# Patient Record
Sex: FEMALE | Race: BLACK OR AFRICAN AMERICAN | Employment: FULL TIME | URBAN - METROPOLITAN AREA
[De-identification: names, ages, dates, MRNs, and addresses within clinical notes are randomized per-mention and may not be internally consistent; named-entity substitution may affect disease eponyms.]

---

## 2022-04-27 ENCOUNTER — OFFICE VISIT (OUTPATIENT)
Dept: SURGERY | Age: 57
End: 2022-04-27
Payer: COMMERCIAL

## 2022-04-27 DIAGNOSIS — N61.1 BREAST ABSCESS: Primary | ICD-10-CM

## 2022-04-27 PROCEDURE — 99203 OFFICE O/P NEW LOW 30 MIN: CPT | Performed by: SURGERY

## 2022-04-27 PROCEDURE — 76642 ULTRASOUND BREAST LIMITED: CPT | Performed by: SURGERY

## 2022-04-27 RX ORDER — ATENOLOL 25 MG/1
25 TABLET ORAL
COMMUNITY
End: 2022-10-11 | Stop reason: ALTCHOICE

## 2022-04-27 RX ORDER — ESCITALOPRAM OXALATE 20 MG/1
20 TABLET ORAL
COMMUNITY

## 2022-04-27 RX ORDER — HYDROCHLOROTHIAZIDE 25 MG/1
25 TABLET ORAL DAILY
COMMUNITY

## 2022-04-27 NOTE — LETTER
4/27/2022 5:09 PM    Patient:  Olivia Love   YOB: 1965  Date of Visit: 4/27/2022      Dear Berhane Allen: Thank you for referring Ms. Will Miller to me for evaluation/treatment. Below are the relevant portions of my assessment and plan of care. If you have questions, please do not hesitate to call me. I look forward to following Ms. Hughes along with you.         Sincerely,    Fatmata Vazquez MD

## 2022-04-27 NOTE — PROGRESS NOTES
HISTORY OF PRESENT ILLNESS  Brandy Jackson is a 78 y.o. female. HPI NEW patient consult referred by  Dr. Adelina Corona for LEFT breast abscess. Symptoms started in 10/2021. The abscess is recurrent x3 and patient has taken 3 rounds of antibiotics. Thought abscess was coming back again but within the past week it has gone down. Gets a shooting pain and thickening of breast skin when it's about to flare. Hx of bilateral breast reduction in  and dx with fibrocystic breast about 25 years ago. Family History: Maternal aunt, breast cancer, dx 72,   Mother, colon cancer, dx 54,       Mammogram, [address], 22, BIRADS 2, LEFT subareolar abscess @ 6:00    Review of Systems   Psychiatric/Behavioral: The patient is nervous/anxious.         Physical Exam    ASSESSMENT and PLAN  {ASSESSMENT/PLAN:40509}

## 2022-04-27 NOTE — PROGRESS NOTES
HISTORY OF PRESENT ILLNESS  Marisela Odell is a 65 y.o. female. HPI  NEW patient consult referred by  Dr. Linda Queen for LEFT breast abscess. Symptoms started in 10/2021. The abscess is recurrent x3 and patient has taken 3 rounds of antibiotics. Thought abscess was coming back again but within the past week it has gone down. Gets a shooting pain and thickening of breast skin when it's about to flare.     Hx of bilateral breast reduction in  and dx with fibrocystic breast about 25 years ago.      Family History: Maternal aunt, breast cancer, dx 72,   Mother, colon cancer, dx 54,     Mammogram, [address], 22, BIRADS 2, LEFT subareolar abscess @ 6:00       No past medical history on file. No past surgical history on file. Social History     Socioeconomic History    Marital status: SINGLE     Spouse name: Not on file    Number of children: Not on file    Years of education: Not on file    Highest education level: Not on file   Occupational History    Not on file   Tobacco Use    Smoking status: Never Smoker    Smokeless tobacco: Never Used   Substance and Sexual Activity    Alcohol use: Yes     Alcohol/week: 5.0 standard drinks     Types: 5 Standard drinks or equivalent per week    Drug use: Never    Sexual activity: Not on file   Other Topics Concern    Not on file   Social History Narrative    Not on file     Social Determinants of Health     Financial Resource Strain:     Difficulty of Paying Living Expenses: Not on file   Food Insecurity:     Worried About Running Out of Food in the Last Year: Not on file    Zay of Food in the Last Year: Not on file   Transportation Needs:     Lack of Transportation (Medical): Not on file    Lack of Transportation (Non-Medical):  Not on file   Physical Activity:     Days of Exercise per Week: Not on file    Minutes of Exercise per Session: Not on file   Stress:     Feeling of Stress : Not on file   Social Connections:     Frequency of Communication with Friends and Family: Not on file    Frequency of Social Gatherings with Friends and Family: Not on file    Attends Scientologist Services: Not on file    Active Member of Clubs or Organizations: Not on file    Attends Club or Organization Meetings: Not on file    Marital Status: Not on file   Intimate Partner Violence:     Fear of Current or Ex-Partner: Not on file    Emotionally Abused: Not on file    Physically Abused: Not on file    Sexually Abused: Not on file   Housing Stability:     Unable to Pay for Housing in the Last Year: Not on file    Number of [place] in the Last Year: Not on file    Unstable Housing in the Last Year: Not on file       Current Outpatient Medications on File Prior to Visit   Medication Sig Dispense Refill    hydroCHLOROthiazide (HYDRODIURIL) 25 mg tablet Take 25 mg by mouth daily.  escitalopram oxalate (Lexapro) 20 mg tablet Take 20 mg by mouth daily.  atenoloL (TENORMIN) 25 mg tablet Take 25 mg by mouth daily. No current facility-administered medications on file prior to visit. Allergies   Allergen Reactions    Aleve [Naproxen Sodium] Unknown (comments)           ROS  Psychiatric/Behavioral: The patient is nervous/anxious. Physical Exam  Exam conducted with a chaperone present. Cardiovascular:      Rate and Rhythm: Normal rate and regular rhythm. Heart sounds: Normal heart sounds. Pulmonary:      Breath sounds: Normal breath sounds. Chest:   Breasts: Breasts are symmetrical.      Right: Normal. No swelling, bleeding, inverted nipple, mass, nipple discharge, skin change, tenderness, axillary adenopathy or supraclavicular adenopathy. Left: Normal. No swelling, bleeding, inverted nipple, mass, nipple discharge, skin change, tenderness, axillary adenopathy or supraclavicular adenopathy.          Lymphadenopathy:      Cervical:      Right cervical: No superficial, deep or posterior cervical adenopathy. Left cervical: No superficial, deep or posterior cervical adenopathy. Upper Body:      Right upper body: No supraclavicular or axillary adenopathy. Left upper body: No supraclavicular or axillary adenopathy. BREAST ULTRASOUND  Indication: LEFT breast mass areola  Technique: The area was scanned using a high-frequency linear-array near-field transducer  Findings: Fluid filled duct tract  Impression: duct fistula tract  Disposition: excision      ASSESSMENT and PLAN    ICD-10-CM ICD-9-CM    1. Breast abscess  N61.1 611.0      New patient presents for evaluation of LEFT breast lesion and is doing well overall. Upon physical examination, noted tubular, mass like structure from nipple to edge of areola at 5:00. LEFT breast US visualizes fluid filled duct, consistent with nipple duct fistula tract. We discussed surgery as an option for treatment. This will be an outpatient procedure, with sutures under the skin, waterproof glue over the incision, and expected recovery time of about 1 week. F/U will call to schedule surgery. This plan was reviewed with the patient and patient agrees. All questions were answered. Total time spent was 40 minutes.     Written by Hang Parisi, as dictated by Dr. Wayne Lockett MD.

## 2022-05-05 DIAGNOSIS — N61.1 BREAST ABSCESS OF FEMALE: Primary | ICD-10-CM

## 2022-05-19 ENCOUNTER — HOSPITAL ENCOUNTER (OUTPATIENT)
Dept: PREADMISSION TESTING | Age: 57
Discharge: HOME | End: 2022-05-19
Payer: COMMERCIAL

## 2022-05-19 VITALS
WEIGHT: 250 LBS | HEART RATE: 73 BPM | RESPIRATION RATE: 18 BRPM | HEIGHT: 67 IN | BODY MASS INDEX: 39.24 KG/M2 | DIASTOLIC BLOOD PRESSURE: 79 MMHG | TEMPERATURE: 97.6 F | SYSTOLIC BLOOD PRESSURE: 125 MMHG

## 2022-05-19 DIAGNOSIS — N61.1 BREAST ABSCESS OF FEMALE: ICD-10-CM

## 2022-05-19 LAB
ANION GAP SERPL CALC-SCNC: 3 MMOL/L (ref 5–15)
BASOPHILS # BLD: 0 K/UL (ref 0–0.1)
BASOPHILS NFR BLD: 1 % (ref 0–1)
BUN SERPL-MCNC: 12 MG/DL (ref 6–20)
BUN/CREAT SERPL: 16 (ref 12–20)
CALCIUM SERPL-MCNC: 8.5 MG/DL (ref 8.5–10.1)
CHLORIDE SERPL-SCNC: 108 MMOL/L (ref 97–108)
CO2 SERPL-SCNC: 29 MMOL/L (ref 21–32)
CREAT SERPL-MCNC: 0.77 MG/DL (ref 0.55–1.02)
DIFFERENTIAL METHOD BLD: ABNORMAL
EOSINOPHIL # BLD: 0.1 K/UL (ref 0–0.4)
EOSINOPHIL NFR BLD: 2 % (ref 0–7)
ERYTHROCYTE [DISTWIDTH] IN BLOOD BY AUTOMATED COUNT: 12.9 % (ref 11.5–14.5)
GLUCOSE SERPL-MCNC: 109 MG/DL (ref 65–100)
HCT VFR BLD AUTO: 40 % (ref 35–47)
HGB BLD-MCNC: 13.9 G/DL (ref 11.5–16)
IMM GRANULOCYTES # BLD AUTO: 0 K/UL (ref 0–0.04)
IMM GRANULOCYTES NFR BLD AUTO: 1 % (ref 0–0.5)
LYMPHOCYTES # BLD: 1.8 K/UL (ref 0.8–3.5)
LYMPHOCYTES NFR BLD: 41 % (ref 12–49)
MCH RBC QN AUTO: 32.2 PG (ref 26–34)
MCHC RBC AUTO-ENTMCNC: 34.8 G/DL (ref 30–36.5)
MCV RBC AUTO: 92.6 FL (ref 80–99)
MONOCYTES # BLD: 0.4 K/UL (ref 0–1)
MONOCYTES NFR BLD: 8 % (ref 5–13)
NEUTS SEG # BLD: 2.1 K/UL (ref 1.8–8)
NEUTS SEG NFR BLD: 47 % (ref 32–75)
NRBC # BLD: 0 K/UL (ref 0–0.01)
NRBC BLD-RTO: 0 PER 100 WBC
PLATELET # BLD AUTO: 197 K/UL (ref 150–400)
PMV BLD AUTO: 10.3 FL (ref 8.9–12.9)
POTASSIUM SERPL-SCNC: 3.8 MMOL/L (ref 3.5–5.1)
RBC # BLD AUTO: 4.32 M/UL (ref 3.8–5.2)
SODIUM SERPL-SCNC: 140 MMOL/L (ref 136–145)
WBC # BLD AUTO: 4.4 K/UL (ref 3.6–11)

## 2022-05-19 PROCEDURE — 85025 COMPLETE CBC W/AUTO DIFF WBC: CPT

## 2022-05-19 PROCEDURE — 80048 BASIC METABOLIC PNL TOTAL CA: CPT

## 2022-05-19 PROCEDURE — 93005 ELECTROCARDIOGRAM TRACING: CPT

## 2022-05-19 NOTE — PERIOP NOTES
[address] INSTRUCTIONS    Surgery Date:   5/26/22    [facility] staff will call you between 4 PM- 8 PM the day before surgery with your arrival time. If your surgery is on a Monday, we will call you the preceding Friday. Please call [phone] after 8 PM if you did not receive your arrival time. 1. Please report to [facility] Patient Access/Admitting on the 1st floor. Bring your insurance card, photo identification, and any copayment ( if applicable). 2. If you are going home the same day of your surgery, you must have a responsible adult to drive you home. You need to have a responsible adult to stay with you the first 24 hours after surgery and you should not drive a car for 24 hours following your surgery. 3. Do NOT eat any solid foods after midnight the night before surgery including candy, mint or gum. You may drink clear liquids from midnight until 1 hour prior to your arrival. You may drink up to 12 ounces at one time every 4 hours. Please note special instructions, if applicable, below for medications. 4. Do NOT drink alcohol or smoke 24 hours before surgery. STOP smoking for 14 days prior as it helps with breathing and healing after surgery. 5. If you are being admitted to the hospital, please leave personal belongings/luggage in your car until you have an assigned hospital room number. 6. Please wear comfortable clothes. Wear your glasses instead of contacts. We ask that all money, jewelry and valuables be left at home. Wear no make up, particularly mascara, the day of surgery. 7.  All body piercings, rings, and jewelry need to be removed and left at home. Please remove any nail polish or artifical nails from your fingernails. Please wear your hair loose or down. Please no pony-tails, buns, or any metal hair accessories. If you shower the morning of surgery, please do not apply any lotions or powders afterwards. You may wear deodorant, unless having breast surgery.   Do not shave any body area within 24 hours of your surgery. 8. Please follow all instructions to avoid any potential surgical cancellation. 9. Should your physical condition change, (i.e. fever, cold, flu, etc.) please notify your surgeon as soon as possible. 10. It is important to be on time. If a situation occurs where you may be delayed, please call:  [phone] / [phone] on the day of surgery. 11. The Preadmission Testing staff can be reached at [phone]. 12. Special instructions: NONE      Current Outpatient Medications   Medication Sig    hydroCHLOROthiazide (HYDRODIURIL) 25 mg tablet Take 25 mg by mouth nightly.  escitalopram oxalate (Lexapro) 20 mg tablet Take 20 mg by mouth nightly.  atenoloL (TENORMIN) 25 mg tablet Take 25 mg by mouth nightly. No current facility-administered medications for this encounter. 1. YOU MUST ONLY TAKE THESE MEDICATIONS THE MORNING OF SURGERY WITH A SIP OF WATER: NONE  2. MEDICATIONS TO TAKE THE MORNING OF SURGERY ONLY IF NEEDED: NONE  3. HOLD these prescription medications BEFORE Surgery: NONE  4. Ask your surgeon/prescribing physician about when/if to STOP taking these medications: NONE  5. Stop all vitamins, herbal medicines and Aspirin containing products 7 days prior to surgery. Stop any non-steroidal anti-inflammatory drugs (i.e. Ibuprofen, Naproxen, Advil, Aleve) 3 days before surgery. You may take Tylenol. 6. If you are currently taking Plavix, Coumadin,or any other blood-thinning/anticoagulant medication contact your prescribing physician for instructions. Eating and Drinking Before Surgery     You may eat a regular dinner at the usual time on the day before your surgery.  Do NOT eat any solid foods after midnight unless your arrival time at the hospital is 3pm or later.  You may drink clear liquids only from 12 midnight until 1 hours prior to your arrival time at the hospital on the day of your surgery.  Do NOT drink alcohol.  Clear liquids include:  o Water  o Fruit juices without pulp( i.e. apple juice)  o Carbonated beverages  o Black coffee (no cream/milk)  o Tea (no cream/milk)  o Gatorade   You may drink up to 12-16 ounces at one time every 4 hours between the hours of midnight and 1 hour before your arrival time at the hospital. Example- if your arrival time at the hospital is 6am, you may drink 12-16 ounces of clear liquids no later than 5am.   If your arrival time at the hospital is 3pm or later, you may eat a light breakfast before 8am.   A light breakfast includes:  o Toast or bagel (no butter)  o Black coffee (no cream/milk)  o Tea (no cream/milk)  o Fruit juices without pulp ( i.e. apple juice)  o Do NOT eat meat, eggs, vegetables or fruit   If you have any questions, please contact your surgeon's office. Preventing Infections Before and After  Your Surgery    IMPORTANT INSTRUCTIONS    You play an important role in your health and preparation for surgery. To reduce the germs on your skin you will need to shower with CHG soap (Chorhexidine gluconate 4%) two times before surgery. CHG soap (Hibiclens, Hex-A-Clens or store brand)   CHG soap will be provided at your Preadmission Testing (PAT) appointment.  If you do not have a PAT appointment before surgery, you may arrange to  CHG soap from our office or purchase CHG soap at a pharmacy, grocery or department store.  You need to purchase TWO 4 ounce bottles to use for your 2 showers. Steps to follow:  1. Wash your hair with your normal shampoo and your body with regular soap and rinse well to remove shampoo and soap from your skin. 2. Wet a clean washcloth and turn off the shower. 3. Put CHG soap on washcloth and apply to your entire body from the neck down. Do not use on your head, face or private parts(genitals). Do not use CHG soap on open sores, wounds or areas of skin irritation. 4. Wash you body gently for 5 minutes.  Do not wash your skin too hard. This soap does not create lather. Pay special attention to your underarms and from your belly button to your feet. 5. Turn the shower back on and rinse well to get CHG soap off your body. 6. Pat your skin dry with a clean, dry towel. Do not apply lotions or moisturizer. 7. Put on clean clothes and sleep on fresh bed sheets and do not allow pets to sleep with you. Shower with CHG soap 2 times before your surgery   The evening before your surgery   The morning of your surgery      Tips to help prevent infections after your surgery:  1. Protect your surgical wound from germs:  ? Hand washing is the most important thing you and your caregivers can do to prevent infections. ? Keep your bandage clean and dry! ? Do not touch your surgical wound. 2. Use clean, freshly washed towels and washcloths every time you shower; do not share bath linens with others. 3. Until your surgical wound is healed, wear clothing and sleep on bed linens each day that are clean and freshly washed. 4. Do not allow pets to sleep in your bed with you or touch your surgical wound. 5. Do not smoke  smoking delays wound healing. This may be a good time to stop smoking. 6. If you have diabetes, it is important for you to manage your blood sugar levels properly before your surgery as well as after your surgery. Poorly managed blood sugar levels slow down wound healing and prevent you from healing completely. Patient Information Regarding COVID Restrictions      Day of Procedure     Please park in the parking deck or any designated visitor parking lot.  Enter the facility through the [facility] of the [facility].   On the day of surgery, please provide the cell phone number of the person who will be waiting for you to the Patient Access representative at the time of registration.  Please wear a mask on the day of your procedure.  We are now allowing two designated visitors per stay.  Pediatric patients may have 2 designated visitors. These two people may come in with you on the day of your procedure.  No visitors under the age of 13.  The designated visitor must also wear a mask.  Once your procedure and the immediate recovery period is completed, a nurse in the recovery area will contact your designated visitor to inform them of your room number or to otherwise review other pertinent information regarding your care.  Social distancing practices are to be adhered to in waiting areas and the cafeteria. The patient was contacted  in person. She verbalized understanding of all instructions does not  need reinforcement.

## 2022-05-20 LAB
ATRIAL RATE: 50 BPM
CALCULATED P AXIS, ECG09: 40 DEGREES
CALCULATED R AXIS, ECG10: -3 DEGREES
CALCULATED T AXIS, ECG11: 24 DEGREES
DIAGNOSIS, 93000: NORMAL
P-R INTERVAL, ECG05: 142 MS
Q-T INTERVAL, ECG07: 458 MS
QRS DURATION, ECG06: 86 MS
QTC CALCULATION (BEZET), ECG08: 417 MS
VENTRICULAR RATE, ECG03: 50 BPM

## 2022-05-25 ENCOUNTER — ANESTHESIA EVENT (OUTPATIENT)
Dept: MEDSURG UNIT | Age: 57
End: 2022-05-25
Payer: COMMERCIAL

## 2022-05-26 ENCOUNTER — HOSPITAL ENCOUNTER (OUTPATIENT)
Age: 57
Setting detail: OUTPATIENT SURGERY
Discharge: HOME | End: 2022-05-26
Attending: SURGERY | Admitting: SURGERY
Payer: COMMERCIAL

## 2022-05-26 ENCOUNTER — ANESTHESIA (OUTPATIENT)
Dept: MEDSURG UNIT | Age: 57
End: 2022-05-26
Payer: COMMERCIAL

## 2022-05-26 VITALS
RESPIRATION RATE: 21 BRPM | SYSTOLIC BLOOD PRESSURE: 125 MMHG | TEMPERATURE: 97.6 F | WEIGHT: 250 LBS | DIASTOLIC BLOOD PRESSURE: 77 MMHG | HEART RATE: 66 BPM | OXYGEN SATURATION: 97 % | BODY MASS INDEX: 39.16 KG/M2

## 2022-05-26 DIAGNOSIS — N61.1 LEFT BREAST ABSCESS: ICD-10-CM

## 2022-05-26 PROCEDURE — 77030040361 HC SLV COMPR DVT MDII -B: Performed by: SURGERY

## 2022-05-26 PROCEDURE — 74011000250 HC RX REV CODE- 250: Performed by: NURSE ANESTHETIST, CERTIFIED REGISTERED

## 2022-05-26 PROCEDURE — 2709999900 HC NON-CHARGEABLE SUPPLY: Performed by: SURGERY

## 2022-05-26 PROCEDURE — 77030011267 HC ELECTRD BLD COVD -A: Performed by: SURGERY

## 2022-05-26 PROCEDURE — 77030002933 HC SUT MCRYL J&J -A: Performed by: SURGERY

## 2022-05-26 PROCEDURE — 77030010507 HC ADH SKN DERMBND J&J -B: Performed by: SURGERY

## 2022-05-26 PROCEDURE — 74011250637 HC RX REV CODE- 250/637: Performed by: ANESTHESIOLOGY

## 2022-05-26 PROCEDURE — 77030002996 HC SUT SLK J&J -A: Performed by: SURGERY

## 2022-05-26 PROCEDURE — 76030000001 HC AMB SURG OR TIME 1 TO 1.5: Performed by: SURGERY

## 2022-05-26 PROCEDURE — 74011250636 HC RX REV CODE- 250/636: Performed by: NURSE ANESTHETIST, CERTIFIED REGISTERED

## 2022-05-26 PROCEDURE — 74011000250 HC RX REV CODE- 250: Performed by: SURGERY

## 2022-05-26 PROCEDURE — 77030040922 HC BLNKT HYPOTHRM STRY -A

## 2022-05-26 PROCEDURE — 77030031139 HC SUT VCRL2 J&J -A: Performed by: SURGERY

## 2022-05-26 PROCEDURE — 77030010509 HC AIRWY LMA MSK TELE -A: Performed by: ANESTHESIOLOGY

## 2022-05-26 PROCEDURE — 88307 TISSUE EXAM BY PATHOLOGIST: CPT

## 2022-05-26 PROCEDURE — 76210000034 HC AMBSU PH I REC 0.5 TO 1 HR: Performed by: SURGERY

## 2022-05-26 PROCEDURE — 76060000062 HC AMB SURG ANES 1 TO 1.5 HR: Performed by: SURGERY

## 2022-05-26 PROCEDURE — 19110 NIPPLE EXPLORATION: CPT | Performed by: SURGERY

## 2022-05-26 RX ORDER — SODIUM CHLORIDE, SODIUM LACTATE, POTASSIUM CHLORIDE, CALCIUM CHLORIDE 600; 310; 30; 20 MG/100ML; MG/100ML; MG/100ML; MG/100ML
125 INJECTION, SOLUTION INTRAVENOUS CONTINUOUS
Status: DISCONTINUED | OUTPATIENT
Start: 2022-05-26 | End: 2022-05-26 | Stop reason: HOSPADM

## 2022-05-26 RX ORDER — DEXAMETHASONE SODIUM PHOSPHATE 4 MG/ML
INJECTION, SOLUTION INTRA-ARTICULAR; INTRALESIONAL; INTRAMUSCULAR; INTRAVENOUS; SOFT TISSUE AS NEEDED
Status: DISCONTINUED | OUTPATIENT
Start: 2022-05-26 | End: 2022-05-26 | Stop reason: HOSPADM

## 2022-05-26 RX ORDER — SODIUM CHLORIDE 0.9 % (FLUSH) 0.9 %
5-40 SYRINGE (ML) INJECTION EVERY 8 HOURS
Status: DISCONTINUED | OUTPATIENT
Start: 2022-05-26 | End: 2022-05-26 | Stop reason: HOSPADM

## 2022-05-26 RX ORDER — FENTANYL CITRATE 50 UG/ML
25 INJECTION, SOLUTION INTRAMUSCULAR; INTRAVENOUS
Status: DISCONTINUED | OUTPATIENT
Start: 2022-05-26 | End: 2022-05-26 | Stop reason: HOSPADM

## 2022-05-26 RX ORDER — MIDAZOLAM HYDROCHLORIDE 1 MG/ML
1 INJECTION, SOLUTION INTRAMUSCULAR; INTRAVENOUS AS NEEDED
Status: DISCONTINUED | OUTPATIENT
Start: 2022-05-26 | End: 2022-05-26 | Stop reason: HOSPADM

## 2022-05-26 RX ORDER — SODIUM CHLORIDE 0.9 % (FLUSH) 0.9 %
5-40 SYRINGE (ML) INJECTION AS NEEDED
Status: DISCONTINUED | OUTPATIENT
Start: 2022-05-26 | End: 2022-05-26 | Stop reason: HOSPADM

## 2022-05-26 RX ORDER — OXYCODONE HYDROCHLORIDE 5 MG/1
5 TABLET ORAL AS NEEDED
Status: DISCONTINUED | OUTPATIENT
Start: 2022-05-26 | End: 2022-05-26 | Stop reason: HOSPADM

## 2022-05-26 RX ORDER — OXYCODONE AND ACETAMINOPHEN 5; 325 MG/1; MG/1
1 TABLET ORAL
Qty: 15 TABLET | Refills: 0 | Status: SHIPPED | OUTPATIENT
Start: 2022-05-26 | End: 2022-05-29

## 2022-05-26 RX ORDER — CEFAZOLIN SODIUM 1 G/3ML
INJECTION, POWDER, FOR SOLUTION INTRAMUSCULAR; INTRAVENOUS AS NEEDED
Status: DISCONTINUED | OUTPATIENT
Start: 2022-05-26 | End: 2022-05-26 | Stop reason: HOSPADM

## 2022-05-26 RX ORDER — MIDAZOLAM HYDROCHLORIDE 1 MG/ML
1 INJECTION, SOLUTION INTRAMUSCULAR; INTRAVENOUS
Status: DISCONTINUED | OUTPATIENT
Start: 2022-05-26 | End: 2022-05-26 | Stop reason: HOSPADM

## 2022-05-26 RX ORDER — PHENYLEPHRINE HCL IN 0.9% NACL 0.4MG/10ML
SYRINGE (ML) INTRAVENOUS AS NEEDED
Status: DISCONTINUED | OUTPATIENT
Start: 2022-05-26 | End: 2022-05-26 | Stop reason: HOSPADM

## 2022-05-26 RX ORDER — ACETAMINOPHEN 325 MG/1
650 TABLET ORAL ONCE
Status: COMPLETED | OUTPATIENT
Start: 2022-05-26 | End: 2022-05-26

## 2022-05-26 RX ORDER — MIDAZOLAM HYDROCHLORIDE 1 MG/ML
INJECTION, SOLUTION INTRAMUSCULAR; INTRAVENOUS AS NEEDED
Status: DISCONTINUED | OUTPATIENT
Start: 2022-05-26 | End: 2022-05-26 | Stop reason: HOSPADM

## 2022-05-26 RX ORDER — DEXTROSE, SODIUM CHLORIDE, SODIUM LACTATE, POTASSIUM CHLORIDE, AND CALCIUM CHLORIDE 5; .6; .31; .03; .02 G/100ML; G/100ML; G/100ML; G/100ML; G/100ML
125 INJECTION, SOLUTION INTRAVENOUS CONTINUOUS
Status: DISCONTINUED | OUTPATIENT
Start: 2022-05-26 | End: 2022-05-26 | Stop reason: HOSPADM

## 2022-05-26 RX ORDER — LIDOCAINE HYDROCHLORIDE 20 MG/ML
INJECTION, SOLUTION EPIDURAL; INFILTRATION; INTRACAUDAL; PERINEURAL AS NEEDED
Status: DISCONTINUED | OUTPATIENT
Start: 2022-05-26 | End: 2022-05-26 | Stop reason: HOSPADM

## 2022-05-26 RX ORDER — FENTANYL CITRATE 50 UG/ML
INJECTION, SOLUTION INTRAMUSCULAR; INTRAVENOUS AS NEEDED
Status: DISCONTINUED | OUTPATIENT
Start: 2022-05-26 | End: 2022-05-26 | Stop reason: HOSPADM

## 2022-05-26 RX ORDER — BUPIVACAINE HYDROCHLORIDE AND EPINEPHRINE 5; 5 MG/ML; UG/ML
30 INJECTION, SOLUTION EPIDURAL; INTRACAUDAL; PERINEURAL ONCE
Status: DISCONTINUED | OUTPATIENT
Start: 2022-05-26 | End: 2022-05-26 | Stop reason: HOSPADM

## 2022-05-26 RX ORDER — SODIUM CHLORIDE, SODIUM LACTATE, POTASSIUM CHLORIDE, CALCIUM CHLORIDE 600; 310; 30; 20 MG/100ML; MG/100ML; MG/100ML; MG/100ML
INJECTION, SOLUTION INTRAVENOUS
Status: DISCONTINUED | OUTPATIENT
Start: 2022-05-26 | End: 2022-05-26 | Stop reason: HOSPADM

## 2022-05-26 RX ORDER — GLYCOPYRROLATE 0.2 MG/ML
INJECTION INTRAMUSCULAR; INTRAVENOUS AS NEEDED
Status: DISCONTINUED | OUTPATIENT
Start: 2022-05-26 | End: 2022-05-26 | Stop reason: HOSPADM

## 2022-05-26 RX ORDER — ONDANSETRON 2 MG/ML
4 INJECTION INTRAMUSCULAR; INTRAVENOUS AS NEEDED
Status: DISCONTINUED | OUTPATIENT
Start: 2022-05-26 | End: 2022-05-26 | Stop reason: HOSPADM

## 2022-05-26 RX ORDER — MORPHINE SULFATE 2 MG/ML
2 INJECTION, SOLUTION INTRAMUSCULAR; INTRAVENOUS
Status: DISCONTINUED | OUTPATIENT
Start: 2022-05-26 | End: 2022-05-26 | Stop reason: HOSPADM

## 2022-05-26 RX ORDER — LIDOCAINE HYDROCHLORIDE AND EPINEPHRINE 10; 10 MG/ML; UG/ML
30 INJECTION, SOLUTION INFILTRATION; PERINEURAL ONCE
Status: DISCONTINUED | OUTPATIENT
Start: 2022-05-26 | End: 2022-05-26 | Stop reason: HOSPADM

## 2022-05-26 RX ORDER — FENTANYL CITRATE 50 UG/ML
50 INJECTION, SOLUTION INTRAMUSCULAR; INTRAVENOUS AS NEEDED
Status: DISCONTINUED | OUTPATIENT
Start: 2022-05-26 | End: 2022-05-26 | Stop reason: HOSPADM

## 2022-05-26 RX ORDER — PROPOFOL 10 MG/ML
INJECTION, EMULSION INTRAVENOUS AS NEEDED
Status: DISCONTINUED | OUTPATIENT
Start: 2022-05-26 | End: 2022-05-26 | Stop reason: HOSPADM

## 2022-05-26 RX ORDER — DIPHENHYDRAMINE HYDROCHLORIDE 50 MG/ML
12.5 INJECTION, SOLUTION INTRAMUSCULAR; INTRAVENOUS AS NEEDED
Status: DISCONTINUED | OUTPATIENT
Start: 2022-05-26 | End: 2022-05-26 | Stop reason: HOSPADM

## 2022-05-26 RX ORDER — ROPIVACAINE HYDROCHLORIDE 5 MG/ML
30 INJECTION, SOLUTION EPIDURAL; INFILTRATION; PERINEURAL AS NEEDED
Status: DISCONTINUED | OUTPATIENT
Start: 2022-05-26 | End: 2022-05-26 | Stop reason: HOSPADM

## 2022-05-26 RX ORDER — ONDANSETRON 2 MG/ML
INJECTION INTRAMUSCULAR; INTRAVENOUS AS NEEDED
Status: DISCONTINUED | OUTPATIENT
Start: 2022-05-26 | End: 2022-05-26 | Stop reason: HOSPADM

## 2022-05-26 RX ORDER — LIDOCAINE HYDROCHLORIDE 10 MG/ML
0.5 INJECTION, SOLUTION EPIDURAL; INFILTRATION; INTRACAUDAL; PERINEURAL AS NEEDED
Status: DISCONTINUED | OUTPATIENT
Start: 2022-05-26 | End: 2022-05-26 | Stop reason: HOSPADM

## 2022-05-26 RX ADMIN — LIDOCAINE HYDROCHLORIDE 100 MG: 20 INJECTION, SOLUTION EPIDURAL; INFILTRATION; INTRACAUDAL; PERINEURAL at 09:39

## 2022-05-26 RX ADMIN — ONDANSETRON HYDROCHLORIDE 4 MG: 2 INJECTION, SOLUTION INTRAMUSCULAR; INTRAVENOUS at 09:39

## 2022-05-26 RX ADMIN — ACETAMINOPHEN 650 MG: 325 TABLET ORAL at 07:51

## 2022-05-26 RX ADMIN — CEFAZOLIN 2 G: 330 INJECTION, POWDER, FOR SOLUTION INTRAMUSCULAR; INTRAVENOUS at 09:43

## 2022-05-26 RX ADMIN — PROPOFOL 200 MG: 10 INJECTION, EMULSION INTRAVENOUS at 09:39

## 2022-05-26 RX ADMIN — MIDAZOLAM 2 MG: 1 INJECTION INTRAMUSCULAR; INTRAVENOUS at 09:29

## 2022-05-26 RX ADMIN — Medication 200 MCG: at 10:13

## 2022-05-26 RX ADMIN — DEXAMETHASONE SODIUM PHOSPHATE 8 MG: 4 INJECTION, SOLUTION INTRAMUSCULAR; INTRAVENOUS at 09:39

## 2022-05-26 RX ADMIN — Medication 200 MCG: at 09:49

## 2022-05-26 RX ADMIN — Medication 200 MCG: at 09:54

## 2022-05-26 RX ADMIN — FENTANYL CITRATE 50 MCG: 50 INJECTION, SOLUTION INTRAMUSCULAR; INTRAVENOUS at 09:39

## 2022-05-26 RX ADMIN — SODIUM CHLORIDE, POTASSIUM CHLORIDE, SODIUM LACTATE AND CALCIUM CHLORIDE: 600; 310; 30; 20 INJECTION, SOLUTION INTRAVENOUS at 09:29

## 2022-05-26 RX ADMIN — Medication 200 MCG: at 10:21

## 2022-05-26 RX ADMIN — GLYCOPYRROLATE 0.2 MG: 0.2 INJECTION, SOLUTION INTRAMUSCULAR; INTRAVENOUS at 10:01

## 2022-05-26 RX ADMIN — Medication 200 MCG: at 10:06

## 2022-05-26 RX ADMIN — GLYCOPYRROLATE 0.2 MG: 0.2 INJECTION, SOLUTION INTRAMUSCULAR; INTRAVENOUS at 09:53

## 2022-05-26 RX ADMIN — FENTANYL CITRATE 50 MCG: 50 INJECTION, SOLUTION INTRAMUSCULAR; INTRAVENOUS at 10:02

## 2022-05-26 NOTE — OP NOTES
[address] [facility]  OPERATIVE REPORT    Name:  Dru Devine  MR#:  [id number]  :  1965  ACCOUNT #:  [de-identified]  DATE OF SERVICE:  2022      PREOPERATIVE DIAGNOSIS:  Recurrent left breast abscess. POSTOPERATIVE DIAGNOSIS:  Recurrent left breast abscess. PROCEDURE PERFORMED:  Left ductal excision. SURGEON:  Gurwinder Banegas MD    ASSISTANT:  Enrico Story SA    ANESTHESIA:  General.    COMPLICATIONS:  None. SPECIMENS REMOVED:  Left breast tissue. IMPLANTS:  None. ESTIMATED BLOOD LOSS:  Minimal.    INDICATIONS:  The patient is a 60-year-old female who has had recurrent left breast abscess. In the office, she felt like she may have had a nipple duct fistula, but today, it feels like this may have resolved and she is admitted for ductal excision for prevention of recurrent breast abscess. PROCEDURE:  After satisfactory induction of general LMA anesthesia, the patient was prepped and draped in a sterile fashion. A circumareolar incision was made in the inferior portion of the nipple-areolar complex and deepened through subcutaneous tissue with Bovie cautery. Limited central duct excision was performed taking care not to devascularize the nipple. Specimen was removed, oriented, and sent to Pathology. All dissection planes were hemostatic. The wound was anesthetized with 0.5% Marcaine and closed with inner layer of 3-0 Vicryl and a running subcuticular 4-0 Monocryl on the skin. The patient tolerated the procedure well with no complications. She was taken to the recovery room in stable condition.           Saralyn Essex, MD      [id number]  D:  2022 10:14  T:  2022 13:41  JOB #:  [id number]  CC:  Jamilah Pena MD

## 2022-05-26 NOTE — BRIEF OP NOTE
Brief Postoperative Note    Patient: Chinyere Mendez  YOB: 1965  MRN: [id number]    Date of Procedure: 5/26/2022     Pre-Op Diagnosis: LEFT BREAST ABSCESS    Post-Op Diagnosis: Same as preoperative diagnosis.       Procedure(s):  LEFT BREAST DUCTAL EXCISION    Surgeon(s):  Jennifer Davison MD    Surgical Assistant: Surg Asst-1: Guillermo Gonzales    Anesthesia: General     Estimated Blood Loss (mL): Minimal    Complications: None    Specimens:   ID Type Source Tests Collected by Time Destination   1 : LEFT BREAST DUCTAL EXCISION Fresh Breast  Jennifer Davison MD 5/26/2022 1004 Pathology        Implants: * No implants in log *    Drains: * No LDAs found *    Findings: left ductal excision    Electronically Signed by Kayce Naik MD on 3/21/7879 at 10:11 AM

## 2022-05-26 NOTE — ANESTHESIA PREPROCEDURE EVALUATION
Relevant Problems   No relevant active problems       Anesthetic History   No history of anesthetic complications            Review of Systems / Medical History  Patient summary reviewed, nursing notes reviewed and pertinent labs reviewed    Pulmonary  Within defined limits                 Neuro/Psych   Within defined limits           Cardiovascular  Within defined limits  Hypertension                   GI/Hepatic/Renal  Within defined limits              Endo/Other  Within defined limits      Morbid obesity     Other Findings              Physical Exam    Airway  Mallampati: II  TM Distance: > 6 cm  Neck ROM: normal range of motion   Mouth opening: Normal     Cardiovascular  Regular rate and rhythm,  S1 and S2 normal,  no murmur, click, rub, or gallop             Dental  No notable dental hx       Pulmonary  Breath sounds clear to auscultation               Abdominal  GI exam deferred       Other Findings            Anesthetic Plan    ASA: 3  Anesthesia type: general          Induction: Intravenous  Anesthetic plan and risks discussed with: Patient

## 2022-05-26 NOTE — DISCHARGE INSTRUCTIONS
Discharge Instructions from Dr. Philip Simon    · I will call you with the pathology results, typically within 1 week from today. · You may shower, but no hot tubs, swimming pools, or baths until your incision is healed. · No heavy lifting with the affected extremity (nothing greater than 5 pounds), and limit its use for the next 4-5 days. · You may use an ice pack for comfort for the next couple of days, but do not place ice directly on the skin. Rather, use a towel or clothing to serve as a barrier between skin and ice to prevent injury. · If I placed a drain, follow the drain instructions provided, especially as you keep a record of the drain output. · Follow medication instructions carefully. · Watch for signs of infection as listed below. · Redness  · Swelling  · Drainage from the incision or from your nipple that appears infected  · Fever over 101 degrees for consecutive readings, or over 99.5 if you are currently undergoing chemotherapy. · Call our office (number is below) for a follow-up appointment. · If you have any problems, our phone number is [phone]. DISCHARGE SUMMARY from Nurse    PATIENT INSTRUCTIONS:    After general anesthesia or intravenous sedation, for 24 hours or while taking prescription Narcotics:  · Limit your activities  · Do not drive and operate hazardous machinery  · Do not make important personal or business decisions  · Do  not drink alcoholic beverages  · If you have not urinated within 8 hours after discharge, please contact your surgeon on call.     Report the following to your surgeon:  · Excessive pain, swelling, redness or odor of or around the surgical area  · Temperature over 100.5  · Nausea and vomiting lasting longer than 4 hours or if unable to take medications  · Any signs of decreased circulation or nerve impairment to extremity: change in color, persistent  numbness, tingling, coldness or increase pain
not applicable

## 2022-05-26 NOTE — PERIOP NOTES
Reviewed discharge instructions with patient's sister, Omayra Moreno, via phone. The sister verbalized understanding. Paper copy given to patient. No further questions at this time.

## 2022-05-26 NOTE — H&P
HISTORY OF PRESENT ILLNESS  Ramo Spence is a 65 y.o. female.     HPI  NEW patient consult referred by  Dr. Mickey Nguyen LEFT breast abscess. Symptoms started in 10/2021. The abscess is recurrent x3 and patient has taken 3 rounds of antibiotics. Thought abscess was coming back again but within the past week it has gone down. Gets a shooting pain and thickening of breast skin when it's about to flare.     Hx of bilateral breast reduction in  and dx with fibrocystic breast about 25 years ago.      Family History: Maternal aunt, breast cancer, dx 72,   Mother, colon cancer, dx 54,      [facility], 23, BIRADS 2, LEFT subareolar abscess @ 6:00        No past medical history on file.     No past surgical history on file.     Social History            Socioeconomic History    Marital status: SINGLE       Spouse name: Not on file    Number of children: Not on file    Years of education: Not on file    Highest education level: Not on file   Occupational History    Not on file   Tobacco Use    Smoking status: Never Smoker    Smokeless tobacco: Never Used   Substance and Sexual Activity    Alcohol use: Yes       Alcohol/week: 5.0 standard drinks       Types: 5 Standard drinks or equivalent per week    Drug use: Never    Sexual activity: Not on file   Other Topics Concern    Not on file   Social History Narrative    Not on file      Social Determinants of Health          Financial Resource Strain:     Difficulty of Paying Living Expenses: Not on file   Food Insecurity:     Worried About Running Out of Food in the Last Year: Not on file    Zay of Food in the Last Year: Not on file   Transportation Needs:     Lack of Transportation (Medical): Not on file    Lack of Transportation (Non-Medical):  Not on file   Physical Activity:     Days of Exercise per Week: Not on file    Minutes of Exercise per Session: Not on file   Stress:     Feeling of Stress : Not on file Social Connections:     Frequency of Communication with Friends and Family: Not on file    Frequency of Social Gatherings with Friends and Family: Not on file    Attends Hinduism Services: Not on file    Active Member of Clubs or Organizations: Not on file    Attends Club or Organization Meetings: Not on file    Marital Status: Not on file   Intimate Partner Violence:     Fear of Current or Ex-Partner: Not on file    Emotionally Abused: Not on file    Physically Abused: Not on file    Sexually Abused: Not on file   Housing Stability:     Unable to Pay for Housing in the Last Year: Not on file    Number of [place] in the Last Year: Not on file    Unstable Housing in the Last Year: Not on file                Current Outpatient Medications on File Prior to Visit   Medication Sig Dispense Refill    hydroCHLOROthiazide (HYDRODIURIL) 25 mg tablet Take 25 mg by mouth daily.        escitalopram oxalate (Lexapro) 20 mg tablet Take 20 mg by mouth daily.        atenoloL (TENORMIN) 25 mg tablet Take 25 mg by mouth daily.          No current facility-administered medications on file prior to visit.              Allergies   Allergen Reactions    Aleve [Naproxen Sodium] Unknown (comments)               ROS  Psychiatric/Behavioral: The patient is nervous/anxious.          Physical Exam  Exam conducted with a chaperone present. Cardiovascular:      Rate and Rhythm: Normal rate and regular rhythm. Heart sounds: Normal heart sounds. Pulmonary:      Breath sounds: Normal breath sounds. Chest:   Breasts: Breasts are symmetrical.      Right: Normal. No swelling, bleeding, inverted nipple, mass, nipple discharge, skin change, tenderness, axillary adenopathy or supraclavicular adenopathy.       Left: Normal. No swelling, bleeding, inverted nipple, mass, nipple discharge, skin change, tenderness, axillary adenopathy or supraclavicular adenopathy.          Lymphadenopathy:      Cervical:      Right cervical: No superficial, deep or posterior cervical adenopathy. Left cervical: No superficial, deep or posterior cervical adenopathy. Upper Body:      Right upper body: No supraclavicular or axillary adenopathy. Left upper body: No supraclavicular or axillary adenopathy.       BREAST ULTRASOUND  Indication: LEFT breast mass areola  Technique: The area was scanned using a high-frequency linear-array near-field transducer  Findings: Fluid filled duct tract  Impression: duct fistula tract  Disposition: excision        ASSESSMENT and PLAN      ICD-10-CM ICD-9-CM     1. Breast abscess  N61.1 611.0        New patient presents for evaluation of LEFT breast lesion and is doing well overall. Upon physical examination, noted tubular, mass like structure from nipple to edge of areola at 5:00. LEFT breast US visualizes fluid filled duct, consistent with nipple duct fistula tract. We discussed surgery as an option for treatment. This will be an outpatient procedure, with sutures under the skin, waterproof glue over the incision, and expected recovery time of about 1 week. F/U will call to schedule surgery. This plan was reviewed with the patient and patient agrees. All questions were answered.

## 2022-06-01 ENCOUNTER — TELEPHONE (OUTPATIENT)
Dept: SURGERY | Age: 57
End: 2022-06-01

## 2022-06-09 ENCOUNTER — HOSPITAL ENCOUNTER (OUTPATIENT)
Dept: PREADMISSION TESTING | Age: 57
Discharge: HOME | End: 2022-06-09
Payer: COMMERCIAL

## 2022-06-09 VITALS
SYSTOLIC BLOOD PRESSURE: 142 MMHG | BODY MASS INDEX: 39.55 KG/M2 | DIASTOLIC BLOOD PRESSURE: 85 MMHG | HEIGHT: 67 IN | TEMPERATURE: 97.8 F | HEART RATE: 65 BPM | WEIGHT: 252 LBS

## 2022-06-09 LAB
ABO + RH BLD: NORMAL
ALBUMIN SERPL-MCNC: 3.5 G/DL (ref 3.5–5)
ALBUMIN/GLOB SERPL: 1.1 {RATIO} (ref 1.1–2.2)
ALP SERPL-CCNC: 88 U/L (ref 45–117)
ALT SERPL-CCNC: 26 U/L (ref 12–78)
ANION GAP SERPL CALC-SCNC: 2 MMOL/L (ref 5–15)
AST SERPL-CCNC: 22 U/L (ref 15–37)
BILIRUB SERPL-MCNC: 0.6 MG/DL (ref 0.2–1)
BLOOD GROUP ANTIBODIES SERPL: NORMAL
BUN SERPL-MCNC: 8 MG/DL (ref 6–20)
BUN/CREAT SERPL: 10 (ref 12–20)
CALCIUM SERPL-MCNC: 8.9 MG/DL (ref 8.5–10.1)
CHLORIDE SERPL-SCNC: 110 MMOL/L (ref 97–108)
CO2 SERPL-SCNC: 29 MMOL/L (ref 21–32)
CREAT SERPL-MCNC: 0.77 MG/DL (ref 0.55–1.02)
GLOBULIN SER CALC-MCNC: 3.1 G/DL (ref 2–4)
GLUCOSE SERPL-MCNC: 117 MG/DL (ref 65–100)
POTASSIUM SERPL-SCNC: 3.7 MMOL/L (ref 3.5–5.1)
PROT SERPL-MCNC: 6.6 G/DL (ref 6.4–8.2)
SODIUM SERPL-SCNC: 141 MMOL/L (ref 136–145)
SPECIMEN EXP DATE BLD: NORMAL

## 2022-06-09 PROCEDURE — 36415 COLL VENOUS BLD VENIPUNCTURE: CPT

## 2022-06-09 PROCEDURE — 80053 COMPREHEN METABOLIC PANEL: CPT

## 2022-06-09 PROCEDURE — 86900 BLOOD TYPING SEROLOGIC ABO: CPT

## 2022-06-09 NOTE — PERIOP NOTES
[address] INSTRUCTIONS    Surgery Date:   6/15/22    Your surgeon's office or [facility] staff will call you between 4 PM- 8 PM the day before surgery with your arrival time. If your surgery is on a Monday, you will receive a call the preceding Friday. 1. Please report to [facility] Patient Access/Admitting on the 1st floor. Bring your insurance card, photo identification, and any copayment ( if applicable). 2. If you are going home the same day of your surgery, you must have a responsible adult to drive you home. You need to have a responsible adult to stay with you the first 24 hours after surgery and you should not drive a car for 24 hours following your surgery. 3. Nothing to eat or drink after midnight the night before surgery. This includes no water, gum, mints, coffee, juice, etc.  Please note special instructions, if applicable, below for medications. 4. Do NOT drink alcohol or smoke 24 hours before surgery. STOP smoking for 14 days prior as it helps with breathing and healing after surgery. 5. If you are being admitted to the hospital, please leave personal belongings/luggage in your car until you have an assigned hospital room number. 6. Please wear comfortable clothes. Wear your glasses instead of contacts. We ask that all money, jewelry and valuables be left at home. Wear no make up, particularly mascara, the day of surgery. 7.  All body piercings, rings, and jewelry need to be removed and left at home. Please remove any nail polish or artificial nails from your fingernails. Please wear your hair loose or down. Please no pony-tails, buns, or any metal hair accessories. If you shower the morning of surgery, please do not apply any lotions or powders afterwards. You may wear deodorant, unless having breast surgery. Do not shave any body area within 24 hours of your surgery. 8. Please follow all instructions to avoid any potential surgical cancellation.   9. Should your physical condition change, (i.e. fever, cold, flu, etc.) please notify your surgeon as soon as possible. 10. It is important to be on time. If a situation occurs where you may be delayed, please call:  [phone] / [phone] on the day of surgery. 11. The Preadmission Testing staff can be reached at [phone]. 12. Special instructions: NONE      Current Outpatient Medications   Medication Sig    hydroCHLOROthiazide (HYDRODIURIL) 25 mg tablet Take 25 mg by mouth nightly.  escitalopram oxalate (Lexapro) 20 mg tablet Take 20 mg by mouth nightly.  atenoloL (TENORMIN) 25 mg tablet Take 25 mg by mouth nightly. No current facility-administered medications for this encounter. 1. YOU MUST ONLY TAKE THESE MEDICATIONS THE MORNING OF SURGERY WITH A SIP OF WATER: NONE  2. MEDICATIONS TO TAKE THE MORNING OF SURGERY ONLY IF NEEDED: NONE  3. HOLD these prescription medications BEFORE Surgery: NONE  4. Ask your surgeon/prescribing physician about when/if to STOP taking these medications: NONE  5. Stop all vitamins, herbal medicines and Aspirin containing products 7 days prior to surgery. Stop any non-steroidal anti-inflammatory drugs (i.e. Ibuprofen, Naproxen, Advil, Aleve) 3 days before surgery. You may take Tylenol. 6. If you are currently taking Plavix, Coumadin, or any other blood-thinning/anticoagulant medication contact your prescribing physician for instructions. Preventing Infections Before and After - Your Surgery    IMPORTANT INSTRUCTIONS      You play an important role in your health and preparation for surgery. To reduce the germs on your skin you will need to shower with CHG soap (Chorhexidine gluconate 4%) two times before surgery. CHG soap (Hibiclens, Hex-A-Clens or store brand)   CHG soap will be provided at your Preadmission Testing (PAT) appointment.    If you do not have a PAT appointment before surgery, you may arrange to  CHG soap from our office or purchase CHG soap at a pharmacy, grocery or department store.  You need to purchase TWO 4 ounce bottles to use for your 2 showers. Steps to follow:  1. Wash your hair with your normal shampoo and your body with regular soap and rinse well to remove shampoo and soap from your skin. 2. Wet a clean washcloth and turn off the shower. 3. Put CHG soap on washcloth and apply to your entire body from the neck down. Do not use on your head, face or private parts(genitals). Do not use CHG soap on open sores, wounds or areas of skin irritation. 4. Wash you body gently for 5 minutes. Do not wash your skin too hard. This soap does not create lather. Pay special attention to your underarms and from your belly button to your feet. 5. Turn the shower back on and rinse well to get CHG soap off your body. 6. Pat your skin dry with a clean, dry towel. Do not apply lotions or moisturizer. 7. Put on clean clothes and sleep on fresh bed sheets and do not allow pets to sleep with you. Shower with CHG soap 2 times before your surgery   The evening before your surgery   The morning of your surgery      Tips to help prevent infections after your surgery:  1. Protect your surgical wound from germs:  ? Hand washing is the most important thing you and your caregivers can do to prevent infections. ? Keep your bandage clean and dry! ? Do not touch your surgical wound. 2. Use clean, freshly washed towels and washcloths every time you shower; do not share bath linens with others. 3. Until your surgical wound is healed, wear clothing and sleep on bed linens each day that are clean and freshly washed. 4. Do not allow pets to sleep in your bed with you or touch your surgical wound. 5. Do not smoke - smoking delays wound healing. This may be a good time to stop smoking. 6. If you have diabetes, it is important for you to manage your blood sugar levels properly before your surgery as well as after your surgery.  Poorly managed blood sugar levels slow down wound healing and prevent you from healing completely. Patient Information Regarding COVID Restrictions    Day of Procedure     Please park in the parking deck or any designated visitor parking lot.  [facility].   On the day of surgery, please provide the cell phone number of the person who will be waiting for you to the Patient Access representative at the time of registration.  Please wear a mask on the day of your procedure.  We are now allowing two designated visitors per stay. Pediatric patients may have 2 designated visitors. These two people may come in with you on the day of your procedure.  No visitors under the age of 13.  The designated visitor must also wear a mask.  Once your procedure and the immediate recovery period is completed, a nurse in the recovery area will contact your designated visitor to inform them of your room number or to otherwise review other pertinent information regarding your care.  Social distancing practices are to be adhered to in waiting areas and the cafeteria. The patient was contacted  in person. She verbalized understanding of all instructions does not  need reinforcement.

## 2022-06-13 NOTE — H&P
Gynecology History and Physical     Name: Santiago Mcgraw MRN: [id number] SSN: [id number]    YOB: 1965  Age: 65 y.o. Sex: female       Subjective:      Chief complaint:  Postmenopausal bleeding    Dee Portillo is a 65 y.o.  with postmenopausal bleeding with gyn u/s. Denies any new bleeding episodes at this time    sure LMP 2020  Then had 10 days of light bleeding in March       EMB showed FRAGMENTS OF DISORDERED   PROLIFERATIVE PHASE ENDOMETRIUM, AND POLYPOID FRAGMENTS   SUGGESTIVE OF BENIGN ENDOMETRIAL POLYP. NO HYPERPLASIA OR   CARCINOMA. She is admitted for Procedure(s) (LRB):  HYSTEROSCOPY DILITATION AND CURETTAGE POLYPECTOMY WITH MYOSURE (CHOICE ANESTH) (N/A). Past Medical History:   Diagnosis Date    Anxiety     Hypertension      Past Surgical History:   Procedure Laterality Date    HX BREAST LUMPECTOMY Left 2022    LEFT BREAST DUCTAL EXCISION performed [facility]z [facility] AMBULATORY OR    HX BREAST REDUCTION      HX CHOLECYSTECTOMY      HX GYN      EMBOLIZATION    HX WISDOM TEETH EXTRACTION       Social History     Occupational History    Not on file   Tobacco Use    Smoking status: Never Smoker    Smokeless tobacco: Never Used   Vaping Use    Vaping Use: Never used   Substance and Sexual Activity    Alcohol use:  Yes     Alcohol/week: 5.0 standard drinks     Types: 5 Standard drinks or equivalent per week     Comment: 1 GLASS WINE DAILY    Drug use: Never    Sexual activity: Not on file     Family History   Problem Relation Age of Onset    Hypertension Mother     Cancer Mother         COLON    Heart Disease Father     Heart Attack Father     Hypertension Father     Cancer Sister         LUNG    Heart Attack Brother     Heart Disease Brother     Heart Attack Brother     Heart Disease Brother     Anesth Problems Neg Hx         Allergies   Allergen Reactions    Latex Hives    Aleve [Naproxen Sodium] Hives     Prior to Admission medications Medication Sig Start Date End Date Taking? Authorizing Provider   hydroCHLOROthiazide (HYDRODIURIL) 25 mg tablet Take 25 mg by mouth nightly. Provider, Historical   escitalopram oxalate (Lexapro) 20 mg tablet Take 20 mg by mouth nightly. Provider, Historical   atenoloL (TENORMIN) 25 mg tablet Take 25 mg by mouth nightly. Provider, Historical        Review of Systems:  A comprehensive review of systems was negative except for that written in the History of Present Illness. Objective: There were no vitals filed for this visit. Physical Exam:  Patient without distress. Heart: Regular rate and rhythm  Lung: clear to auscultation throughout lung fields, no wheezes, no rales, no rhonchi and normal respiratory effort  Abdomen: soft, nontender    Assessment:     Active Problems:    * No active hospital problems. *     64 y/o with postmenopausal bleeding. 1. uterine leiomyoma  -ultrasound reviewed. multiple fibroids. one appears to distort the endometrium but endometrium not fully visualized. 2. polyp of corpus uteri  -EMB showed fragments c/w polyp.  -Reviewed D&C Hysteroscopy procedure.  -Reviewed risks of bleeding, uterine perforation, inability to complete procedure.  -Reviewed postop pain and bleeding expectations.  -All questions answered.  -Plan for hysteroscopy, D&C, myosure on 6/15 at [facility]    Plan:     Procedure(s) (LRB):  HYSTEROSCOPY DILITATION AND CURETTAGE POLYPECTOMY WITH MYOSURE (CHOICE ANESTH) (N/A)  Discussed the risks of surgery including the risks of bleeding, infection, deep vein thrombosis, and surgical injuries to internal organs including but not limited to the bowels, bladder, rectum, and female reproductive organs. The patient understands the risks; any and all questions were answered to the patient's satisfaction.

## 2022-06-14 ENCOUNTER — ANESTHESIA EVENT (OUTPATIENT)
Dept: SURGERY | Age: 57
End: 2022-06-14
Payer: COMMERCIAL

## 2022-06-15 ENCOUNTER — ANESTHESIA (OUTPATIENT)
Dept: SURGERY | Age: 57
End: 2022-06-15
Payer: COMMERCIAL

## 2022-06-15 ENCOUNTER — HOSPITAL ENCOUNTER (OUTPATIENT)
Age: 57
Setting detail: OUTPATIENT SURGERY
Discharge: HOME | End: 2022-06-15
Attending: OBSTETRICS & GYNECOLOGY | Admitting: OBSTETRICS & GYNECOLOGY
Payer: COMMERCIAL

## 2022-06-15 VITALS
WEIGHT: 252 LBS | HEIGHT: 67 IN | RESPIRATION RATE: 17 BRPM | TEMPERATURE: 97.6 F | HEART RATE: 65 BPM | BODY MASS INDEX: 39.55 KG/M2 | DIASTOLIC BLOOD PRESSURE: 81 MMHG | SYSTOLIC BLOOD PRESSURE: 139 MMHG | OXYGEN SATURATION: 95 %

## 2022-06-15 PROCEDURE — 74011250637 HC RX REV CODE- 250/637: Performed by: ANESTHESIOLOGY

## 2022-06-15 PROCEDURE — 76210000020 HC REC RM PH II FIRST 0.5 HR: Performed by: OBSTETRICS & GYNECOLOGY

## 2022-06-15 PROCEDURE — 74011000250 HC RX REV CODE- 250: Performed by: NURSE ANESTHETIST, CERTIFIED REGISTERED

## 2022-06-15 PROCEDURE — 74011000250 HC RX REV CODE- 250: Performed by: OBSTETRICS & GYNECOLOGY

## 2022-06-15 PROCEDURE — 77030026236 HC DEV TISS RMVL MYOSUR HOLO -G1: Performed by: OBSTETRICS & GYNECOLOGY

## 2022-06-15 PROCEDURE — 77030040922 HC BLNKT HYPOTHRM STRY -A

## 2022-06-15 PROCEDURE — 77030041423 HC SYST FLUID MNGMT FLUENT HOLO -D: Performed by: OBSTETRICS & GYNECOLOGY

## 2022-06-15 PROCEDURE — 2709999900 HC NON-CHARGEABLE SUPPLY: Performed by: OBSTETRICS & GYNECOLOGY

## 2022-06-15 PROCEDURE — 88305 TISSUE EXAM BY PATHOLOGIST: CPT

## 2022-06-15 PROCEDURE — 77030010509 HC AIRWY LMA MSK TELE -A: Performed by: ANESTHESIOLOGY

## 2022-06-15 PROCEDURE — 76060000032 HC ANESTHESIA 0.5 TO 1 HR: Performed by: OBSTETRICS & GYNECOLOGY

## 2022-06-15 PROCEDURE — 76210000006 HC OR PH I REC 0.5 TO 1 HR: Performed by: OBSTETRICS & GYNECOLOGY

## 2022-06-15 PROCEDURE — 74011250636 HC RX REV CODE- 250/636: Performed by: ANESTHESIOLOGY

## 2022-06-15 PROCEDURE — 77030040361 HC SLV COMPR DVT MDII -B: Performed by: OBSTETRICS & GYNECOLOGY

## 2022-06-15 PROCEDURE — 74011250636 HC RX REV CODE- 250/636: Performed by: NURSE ANESTHETIST, CERTIFIED REGISTERED

## 2022-06-15 PROCEDURE — 76010000138 HC OR TIME 0.5 TO 1 HR: Performed by: OBSTETRICS & GYNECOLOGY

## 2022-06-15 RX ORDER — MIDAZOLAM HYDROCHLORIDE 1 MG/ML
1 INJECTION, SOLUTION INTRAMUSCULAR; INTRAVENOUS AS NEEDED
Status: DISCONTINUED | OUTPATIENT
Start: 2022-06-15 | End: 2022-06-15 | Stop reason: HOSPADM

## 2022-06-15 RX ORDER — SODIUM CHLORIDE, SODIUM LACTATE, POTASSIUM CHLORIDE, CALCIUM CHLORIDE 600; 310; 30; 20 MG/100ML; MG/100ML; MG/100ML; MG/100ML
1000 INJECTION, SOLUTION INTRAVENOUS CONTINUOUS
Status: DISCONTINUED | OUTPATIENT
Start: 2022-06-15 | End: 2022-06-15 | Stop reason: HOSPADM

## 2022-06-15 RX ORDER — HYDROCODONE BITARTRATE AND ACETAMINOPHEN 5; 325 MG/1; MG/1
1 TABLET ORAL AS NEEDED
Status: DISCONTINUED | OUTPATIENT
Start: 2022-06-15 | End: 2022-06-15 | Stop reason: HOSPADM

## 2022-06-15 RX ORDER — MIDAZOLAM HYDROCHLORIDE 1 MG/ML
0.5 INJECTION, SOLUTION INTRAMUSCULAR; INTRAVENOUS
Status: DISCONTINUED | OUTPATIENT
Start: 2022-06-15 | End: 2022-06-15 | Stop reason: HOSPADM

## 2022-06-15 RX ORDER — HYDROMORPHONE HYDROCHLORIDE 1 MG/ML
0.2 INJECTION, SOLUTION INTRAMUSCULAR; INTRAVENOUS; SUBCUTANEOUS
Status: DISCONTINUED | OUTPATIENT
Start: 2022-06-15 | End: 2022-06-15 | Stop reason: HOSPADM

## 2022-06-15 RX ORDER — SODIUM CHLORIDE 0.9 % (FLUSH) 0.9 %
5-40 SYRINGE (ML) INJECTION EVERY 8 HOURS
Status: DISCONTINUED | OUTPATIENT
Start: 2022-06-15 | End: 2022-06-15 | Stop reason: HOSPADM

## 2022-06-15 RX ORDER — DEXAMETHASONE SODIUM PHOSPHATE 4 MG/ML
INJECTION, SOLUTION INTRA-ARTICULAR; INTRALESIONAL; INTRAMUSCULAR; INTRAVENOUS; SOFT TISSUE AS NEEDED
Status: DISCONTINUED | OUTPATIENT
Start: 2022-06-15 | End: 2022-06-15 | Stop reason: HOSPADM

## 2022-06-15 RX ORDER — MIDAZOLAM HYDROCHLORIDE 1 MG/ML
INJECTION, SOLUTION INTRAMUSCULAR; INTRAVENOUS AS NEEDED
Status: DISCONTINUED | OUTPATIENT
Start: 2022-06-15 | End: 2022-06-15 | Stop reason: HOSPADM

## 2022-06-15 RX ORDER — LIDOCAINE HYDROCHLORIDE 20 MG/ML
INJECTION, SOLUTION EPIDURAL; INFILTRATION; INTRACAUDAL; PERINEURAL AS NEEDED
Status: DISCONTINUED | OUTPATIENT
Start: 2022-06-15 | End: 2022-06-15 | Stop reason: HOSPADM

## 2022-06-15 RX ORDER — MORPHINE SULFATE 2 MG/ML
2 INJECTION, SOLUTION INTRAMUSCULAR; INTRAVENOUS
Status: DISCONTINUED | OUTPATIENT
Start: 2022-06-15 | End: 2022-06-15 | Stop reason: HOSPADM

## 2022-06-15 RX ORDER — FENTANYL CITRATE 50 UG/ML
INJECTION, SOLUTION INTRAMUSCULAR; INTRAVENOUS AS NEEDED
Status: DISCONTINUED | OUTPATIENT
Start: 2022-06-15 | End: 2022-06-15 | Stop reason: HOSPADM

## 2022-06-15 RX ORDER — ONDANSETRON 2 MG/ML
4 INJECTION INTRAMUSCULAR; INTRAVENOUS AS NEEDED
Status: DISCONTINUED | OUTPATIENT
Start: 2022-06-15 | End: 2022-06-15 | Stop reason: HOSPADM

## 2022-06-15 RX ORDER — ALBUTEROL SULFATE 0.83 MG/ML
2.5 SOLUTION RESPIRATORY (INHALATION) AS NEEDED
Status: DISCONTINUED | OUTPATIENT
Start: 2022-06-15 | End: 2022-06-15 | Stop reason: HOSPADM

## 2022-06-15 RX ORDER — GLYCOPYRROLATE 0.2 MG/ML
0.2 INJECTION INTRAMUSCULAR; INTRAVENOUS
Status: DISCONTINUED | OUTPATIENT
Start: 2022-06-15 | End: 2022-06-15 | Stop reason: HOSPADM

## 2022-06-15 RX ORDER — KETOROLAC TROMETHAMINE 30 MG/ML
INJECTION, SOLUTION INTRAMUSCULAR; INTRAVENOUS AS NEEDED
Status: DISCONTINUED | OUTPATIENT
Start: 2022-06-15 | End: 2022-06-15 | Stop reason: HOSPADM

## 2022-06-15 RX ORDER — FENTANYL CITRATE 50 UG/ML
25 INJECTION, SOLUTION INTRAMUSCULAR; INTRAVENOUS
Status: DISCONTINUED | OUTPATIENT
Start: 2022-06-15 | End: 2022-06-15 | Stop reason: HOSPADM

## 2022-06-15 RX ORDER — ACETAMINOPHEN 325 MG/1
650 TABLET ORAL ONCE
Status: COMPLETED | OUTPATIENT
Start: 2022-06-15 | End: 2022-06-15

## 2022-06-15 RX ORDER — SILVER NITRATE 38.21; 12.74 MG/1; MG/1
STICK TOPICAL AS NEEDED
Status: DISCONTINUED | OUTPATIENT
Start: 2022-06-15 | End: 2022-06-15 | Stop reason: HOSPADM

## 2022-06-15 RX ORDER — ONDANSETRON 2 MG/ML
INJECTION INTRAMUSCULAR; INTRAVENOUS AS NEEDED
Status: DISCONTINUED | OUTPATIENT
Start: 2022-06-15 | End: 2022-06-15 | Stop reason: HOSPADM

## 2022-06-15 RX ORDER — SODIUM CHLORIDE 9 MG/ML
25 INJECTION, SOLUTION INTRAVENOUS CONTINUOUS
Status: DISCONTINUED | OUTPATIENT
Start: 2022-06-15 | End: 2022-06-15 | Stop reason: HOSPADM

## 2022-06-15 RX ORDER — LIDOCAINE HYDROCHLORIDE 10 MG/ML
0.1 INJECTION, SOLUTION EPIDURAL; INFILTRATION; INTRACAUDAL; PERINEURAL AS NEEDED
Status: DISCONTINUED | OUTPATIENT
Start: 2022-06-15 | End: 2022-06-15 | Stop reason: HOSPADM

## 2022-06-15 RX ORDER — SODIUM CHLORIDE 0.9 % (FLUSH) 0.9 %
5-40 SYRINGE (ML) INJECTION AS NEEDED
Status: DISCONTINUED | OUTPATIENT
Start: 2022-06-15 | End: 2022-06-15 | Stop reason: HOSPADM

## 2022-06-15 RX ORDER — FENTANYL CITRATE 50 UG/ML
50 INJECTION, SOLUTION INTRAMUSCULAR; INTRAVENOUS AS NEEDED
Status: DISCONTINUED | OUTPATIENT
Start: 2022-06-15 | End: 2022-06-15 | Stop reason: HOSPADM

## 2022-06-15 RX ORDER — DIPHENHYDRAMINE HYDROCHLORIDE 50 MG/ML
12.5 INJECTION, SOLUTION INTRAMUSCULAR; INTRAVENOUS AS NEEDED
Status: DISCONTINUED | OUTPATIENT
Start: 2022-06-15 | End: 2022-06-15 | Stop reason: HOSPADM

## 2022-06-15 RX ORDER — PROPOFOL 10 MG/ML
INJECTION, EMULSION INTRAVENOUS AS NEEDED
Status: DISCONTINUED | OUTPATIENT
Start: 2022-06-15 | End: 2022-06-15 | Stop reason: HOSPADM

## 2022-06-15 RX ORDER — ROPIVACAINE HYDROCHLORIDE 5 MG/ML
30 INJECTION, SOLUTION EPIDURAL; INFILTRATION; PERINEURAL AS NEEDED
Status: DISCONTINUED | OUTPATIENT
Start: 2022-06-15 | End: 2022-06-15 | Stop reason: HOSPADM

## 2022-06-15 RX ADMIN — DEXAMETHASONE SODIUM PHOSPHATE 4 MG: 4 INJECTION, SOLUTION INTRAMUSCULAR; INTRAVENOUS at 08:05

## 2022-06-15 RX ADMIN — LIDOCAINE HYDROCHLORIDE 40 MG: 20 INJECTION, SOLUTION EPIDURAL; INFILTRATION; INTRACAUDAL; PERINEURAL at 08:05

## 2022-06-15 RX ADMIN — FENTANYL CITRATE 50 MCG: 50 INJECTION, SOLUTION INTRAMUSCULAR; INTRAVENOUS at 08:02

## 2022-06-15 RX ADMIN — ONDANSETRON HYDROCHLORIDE 4 MG: 2 INJECTION, SOLUTION INTRAMUSCULAR; INTRAVENOUS at 08:05

## 2022-06-15 RX ADMIN — FENTANYL CITRATE 25 MCG: 50 INJECTION, SOLUTION INTRAMUSCULAR; INTRAVENOUS at 08:13

## 2022-06-15 RX ADMIN — PROPOFOL 150 MG: 10 INJECTION, EMULSION INTRAVENOUS at 08:05

## 2022-06-15 RX ADMIN — ONDANSETRON HYDROCHLORIDE 4 MG: 2 SOLUTION INTRAMUSCULAR; INTRAVENOUS at 09:18

## 2022-06-15 RX ADMIN — PROPOFOL 30 MG: 10 INJECTION, EMULSION INTRAVENOUS at 08:18

## 2022-06-15 RX ADMIN — MIDAZOLAM 2 MG: 1 INJECTION INTRAMUSCULAR; INTRAVENOUS at 07:57

## 2022-06-15 RX ADMIN — SODIUM CHLORIDE, POTASSIUM CHLORIDE, SODIUM LACTATE AND CALCIUM CHLORIDE 1000 ML: 600; 310; 30; 20 INJECTION, SOLUTION INTRAVENOUS at 07:04

## 2022-06-15 RX ADMIN — KETOROLAC TROMETHAMINE 15 MG: 30 INJECTION, SOLUTION INTRAMUSCULAR; INTRAVENOUS at 08:38

## 2022-06-15 RX ADMIN — ACETAMINOPHEN 650 MG: 325 TABLET ORAL at 07:04

## 2022-06-15 RX ADMIN — FENTANYL CITRATE 25 MCG: 50 INJECTION, SOLUTION INTRAMUSCULAR; INTRAVENOUS at 08:19

## 2022-06-15 NOTE — BRIEF OP NOTE
Brief Postoperative Note    Patient: Vladimir Hyde  YOB: 1965  MRN: [id number]    Date of Procedure: 6/15/2022     Pre-Op Diagnosis: postmenopausal bleeding  UTERINE POLYP    Post-Op Diagnosis: postmenopausal bleeding  Cervical polyp    Procedure(s): HYSTEROSCOPY DILITATION AND CURETTAGE POLYPECTOMY WITH Sherol Spearing     Surgeon(s):  Kesha Bowden MD    Surgical Assistant: None    Anesthesia: General     Estimated Blood Loss (mL): Minimal    Complications: None    Specimens:   ID Type Source Tests Collected by Time Destination   1 : ENDOMETRIAL CURRETTINGS Fresh Uterine  Kesha Bowden MD 6/15/2022 9821 Pathology        Implants: * No implants in log *    Drains: * No LDAs found *    Findings: Normal external genitalia. Normal cervix with no lesions. Uterus sounding to 6 cm. Thickened endometrium. Intracervical polyp noted.      Electronically Signed by Alex Maldonado MD on 6/15/2022 at 8:35 AM

## 2022-06-15 NOTE — INTERVAL H&P NOTE
Update History & Physical    The Patient's History and Physical was reviewed with the patient and I examined the patient. There was no change. The surgical site was confirmed by the patient and me. Plan:  The risk, benefits, expected outcome, and alternative to the recommended procedure have been discussed with the patient. Patient understands and wants to proceed with the procedure. To OR for hysteroscopy/D&C/polypectomy with myosure. Reviewed plan for surgery and expected postoperative course. Consent signed.      Electronically signed by Raad Cuba MD on 6/15/2022 at 7:45 AM

## 2022-06-15 NOTE — DISCHARGE INSTRUCTIONS
Patient Education        Hysteroscopy With Dilation and Curettage: What to Expect at [address]     For a hysteroscopy, your doctor guides a lighted tube through your cervix and into your uterus. This helps the doctor see inside your uterus. For a dilation and curettage (D&C), your doctor uses a curved tool, called a curette, to gently scrape tissue from your uterus. After these procedures, you are likely to have a backache or cramps similar to menstrual cramps. Expect to pass small clots of blood from your vagina for the first few days. You may have light vaginal bleeding for several weeks after the D&C. If the doctor filled your uterus with air, your belly may feel full. You may also have shoulder pain right after the procedure. You will probably be able to go back to most of your normal activities in 1 or 2 days. This care sheet gives you a general idea about how long it will take for you to recover. But each person recovers at a different pace. Follow the steps below to get better as quickly as possible. How can you care for yourself at home? Activity    · Rest when you feel tired.     · You will probably be able to return to work the day after the procedure. But it depends on what was done and the type of work you do.     · You may have some light vaginal bleeding. Use sanitary pads until you stop bleeding. Using pads makes it easier to monitor your bleeding. Do not rinse your vagina with fluid (douche).   · Ask your doctor when it is okay for you to have sex. Diet    · You can eat your normal diet. If your stomach is upset, try bland, low-fat foods like plain rice, broiled chicken, toast, and yogurt.     · Drink plenty of fluids (unless your doctor tells you not to). Medicines    · Your doctor will tell you if and when you can restart your medicines.  You will also get instructions about taking any new medicines.     · If you take aspirin or some other blood thinner, ask your doctor if and when to start taking it again. Make sure that you understand exactly what your doctor wants you to do.     · Be safe with medicines. Read and follow all instructions on the label. ? If the doctor gave you a prescription medicine for pain, take it as prescribed. ? If you are not taking a prescription pain medicine, ask your doctor if you can take an over-the-counter medicine.     · If your doctor prescribed antibiotics, take them as directed. Do not stop taking them just because you feel better. You need to take the full course of antibiotics. Follow-up care is a key part of your treatment and safety. Be sure to make and go to all appointments, and call your doctor if you are having problems. It's also a good idea to know your test results and keep a list of the medicines you take. When should you call for help? Call 911 anytime you think you may need emergency care. For example, call if:    · You passed out (lost consciousness).     · You have chest pain, are short of breath, or cough up blood. Call your doctor now or seek immediate medical care if:    · You have pain that does not get better after you take pain medicine.     · You cannot pass stools or gas.     · You have bright red vaginal bleeding that soaks one or more pads in an hour, or you have large clots.     · You have a vaginal discharge that has increased or that smells bad.     · You are sick to your stomach or cannot drink fluids.     · You have symptoms of a blood clot in your leg (called a deep vein thrombosis), such as:  ? Pain in your calf, back of the knee, thigh, or groin. ? Redness and swelling in your leg.     · You have signs of infection, such as:  ? Increased pain, swelling, warmth, or redness. ? A fever. Watch closely for changes in your health, and be sure to contact your doctor if you have any problems. Where can you learn more?   Go to http://www.gray.com/  Enter V710 in the search box to learn more about \"Hysteroscopy With Dilation and Curettage: What to Expect at Home. \"  Current as of: June 16, 2021               Content Version: 13.2  © 2006-2022 [employer]. Care instructions adapted under license by [employer] (which disclaims liability or warranty for this information). If you have questions about a medical condition or this instruction, always ask your healthcare professional. [address] any warranty or liability for your use of this information. ______________________________________________________________________    Anesthesia Discharge Instructions    After general anesthesia or intervenous sedation, for 24 hours or while taking prescription Narcotics:  · Limit your activities  · Do not drive or operate hazardous machinery  · If you have not urinated within 8 hours after discharge, please contact your surgeon on call. · Do not make important personal or business decisions  · Do not drink alcoholic beverages    Report the following to your surgeon:  · Excessive pain, swelling, redness or odor of or around the surgical area  · Temperature over 100.5 degrees  · Nausea and vomiting lasting longer than 4 hours or if unable to take medication  · Any signs of decreased circulation or nerve impairment to extremity:  Change in color, persistent numbness, tingling, coldness or increased pain.   · Any questions

## 2022-06-15 NOTE — ROUTINE PROCESS
Patient: Brian May MRN: [id number]  SSN: [id number]   YOB: 1965  Age: 65 y.o. Sex: female     Patient is status post Procedure(s): HYSTEROSCOPY DILITATION AND CURETTAGE POLYPECTOMY WITH MYOSURE .     Surgeon(s) and Role:     Roberto Stoner MD - Primary                      Peripheral IV 06/15/22 Anterior;Distal;Left Forearm (Active)            Airway - Endotracheal Tube 06/15/22 Oral (Active)                   Dressing/Packing:  Incision 06/15/22 Perineum-Dressing/Treatment: Peripad (06/15/22 0700)

## 2022-06-15 NOTE — OP NOTES
HSC/D&C    DATE OF SURGERY: 6/15/2022  SURGEON: Kali Leija MD  PREOPERATIVE DIAGNOSIS: postmenopausal bleeding  POSTOPERATIVE DIAGNOSIS: same  PROCEDURE PERFORMED:    1) Hysteroscopy dilation and curettage    2) polypectomy with myosure  ANESTHESIA: GETA  INTRAVENOUS FLUIDS: 500 cc LR  ESTIMATED BLOOD LOSS: 10 cc  FLUID DEFICIT: 60 cc  URINE OUTPUT: 50 cc  FINDINGS: Normal external genitalia. Normal cervix with no lesions. Uterus sounding to 6 cm. Thickened endometrium. Intracervical polyp noted. PATHOLOGY: endometrial and endocervical currettings  COMPLICATIONS: None  DISPOSITION: Pt stable to PACU    INDICATIONS FOR THE PROCEDURE:  HISTORY: Pt with post-menopausal bleeding. The patient had an endometrial biopsy that was benign. The patient had an ultrasound that showed likely endometrial poylp. SURGICAL RISKS:   The patient was informed of the risks and benefits of hysteroscopy with dilation and curettage. Risks included but were not limited to bleeding, infection, injury to the vulva, vagina, cervix and uterine perforation. The patient expressed understanding of the risks involved, all questions were answered and the patient consented to the procedure. DESCRIPTION OF THE PROCEDURE:  The patient was taken to the operating room where a time out was performed to confirm correct patient and correct procedure. The patient was not given pre-operative intravenous antibiotics as per ACOG guidelines. General anesthesia was established. The patient was then positioned on the operating table in the dorsal lithotomy position with the legs supported using stirrups. All pressure points were padded and a Morgan hugger was placed to maintain control of core body temperature. The patient was then prepped and draped in the usual sterile fashion. A bimanual exam was performed and the uterus was found to be approximately X week sized, anteverted/retroverted.  A straight catheter was inserted into the bladder and 50 mL of urine was obtained. OPERATIVE TECHNIQUE:  The bivalve speculum was then inserted into the vagina. The anterior lip of the cervix was visualized and grasped using a single-tooth tenaculum. The cervix was adequately dilated using Thomas dilators for the introduction of the hysteroscope. The hysteroscope was introduced under direct visualization using normal saline solution as the distending media. The hysteroscope was advanced to the fundus and the entire uterine cavity was inspected. Findings were as noted as above. Myosure device was used to perform curettage and cervical polypectomy. The hysteroscope was then removed and a sharp curetting was performed. This was done by starting at the 12 o'clock position and rotation a total of 360 degrees in order to cover all surfaces. Endometrial tissue was obtained and was sent to pathology. Endocervical curetting was also performed and this tissue was sent to pathology as well. Following the curetting, good hemostasis was noted. The single tooth tenaculum was removed from the anterior lip of the cervix and hemostasis was also noted at the tenaculum puncture sites after use of silver nitrate. The speculum was then removed from the vagina. At the end of the procedure, all needle, sponge and instrument counts were noted to be correct x2. The patient tolerated the procedure well and was transferred to the recovery room in stable condition.

## 2022-06-15 NOTE — PERIOP NOTES
I have reviewed discharge instructions in person with the patient and sister using teach back method. The patient and sister verbalized understanding. Signs, symptoms, and side effects reviewed. Opportunity for questions and clarification allowed. Patient discharging home via private vehicle. Hard-copy of discharge instructions given to patient. Copy of discharge instructions signed electronically.

## 2022-06-15 NOTE — ANESTHESIA POSTPROCEDURE EVALUATION
Post-Anesthesia Evaluation and Assessment    Patient: Ami Greer MRN: [id number]  SSN: [id number]    YOB: 1965  Age: 65 y.o. Sex: female      I have evaluated the patient and they are stable and ready for discharge from the PACU. Cardiovascular Function/Vital Signs  Visit Vitals  /81 (BP 1 Location: Left upper arm, BP Patient Position: At rest)   Pulse 65   Temp 36.4 °C (97.6 °F)   Resp 17   Ht 5' 7\" (1.702 m)   Wt 114.3 kg (252 lb)   SpO2 95%   BMI 39.47 kg/m²       Patient is status post General anesthesia for Procedure(s): HYSTEROSCOPY DILITATION AND CURETTAGE POLYPECTOMY WITH MYOSURE . Nausea/Vomiting: None    Postoperative hydration reviewed and adequate. Pain:  Pain Scale 1: Numeric (0 - 10) (06/15/22 0901)  Pain Intensity 1: 0 (06/15/22 0901)   Managed    Neurological Status:   Neuro (WDL): Within Defined Limits (06/15/22 0901)  Neuro  LUE Motor Response: Purposeful (06/15/22 0901)  LLE Motor Response: Purposeful (06/15/22 0901)  RUE Motor Response: Purposeful (06/15/22 0901)  RLE Motor Response: Purposeful (06/15/22 0901)   At baseline    Mental Status, Level of Consciousness: Alert and  oriented to person, place, and time    Pulmonary Status:   O2 Device: None;None (Room air) (06/15/22 0844)   Adequate oxygenation and airway patent    Complications related to anesthesia: None    Post-anesthesia assessment completed. No concerns    Signed By: Jeyson Chaudhry MD     [place] 15, 2022              Procedure(s): HYSTEROSCOPY DILITATION AND CURETTAGE POLYPECTOMY WITH MYOSURE .    general    <BSHSIANPOST>    INITIAL Post-op Vital signs:   Vitals Value Taken Time   /88 06/15/22 0915   Temp 36.4 °C (97.6 °F) 06/15/22 0901   Pulse 53 06/15/22 0920   Resp 10 06/15/22 0920   SpO2 96 % 06/15/22 0920   Vitals shown include unvalidated device data.

## 2022-06-29 NOTE — PROGRESS NOTES
HISTORY OF PRESENT ILLNESS  Ana Maria Peterson is a 65 y.o. female. HPI  ESTABLISHED Patient is here for post op follow up of LEFT breast ductal excision on 2022. Denies any pain or changes to the breast area.        Pathology-   FINAL PATHOLOGIC DIAGNOSIS         Endometrium, curettage:        Fragments of benign endocervical polyp   Fragments suggestive of benign endometrial polyp   Fragments of benign proliferative endometrium      Family History: Maternal aunt, breast cancer, dx 72,   Mother, colon cancer, dx 54,      [address] [place] [address], 23, BIRADS 2, LEFT subareolar abscess @ 6:00        Past Medical History:   Diagnosis Date    Anxiety     Hypertension        Past Surgical History:   Procedure Laterality Date    HX BREAST LUMPECTOMY Left 2022    LEFT BREAST DUCTAL EXCISION performed by Sidney [facility]reilly [facility] at [facility]    HX BREAST REDUCTION      HX CHOLECYSTECTOMY      HX GYN      EMBOLIZATION    HX WISDOM TEETH EXTRACTION         Social History     Socioeconomic History    Marital status: SINGLE     Spouse name: Not on file    Number of children: Not on file    Years of education: Not on file    Highest education level: Not on file   Occupational History    Not on file   Tobacco Use    Smoking status: Never Smoker    Smokeless tobacco: Never Used   Vaping Use    Vaping Use: Never used   Substance and Sexual Activity    Alcohol use:  Yes     Alcohol/week: 5.0 standard drinks     Types: 5 Standard drinks or equivalent per week     Comment: 1 GLASS WINE DAILY    Drug use: Never    Sexual activity: Not on file   Other Topics Concern    Not on file   Social History Narrative    Not on file     Social Determinants of Health     Financial Resource Strain:     Difficulty of Paying Living Expenses: Not on file   Food Insecurity:     Worried About Running Out of Food in the Last Year: Not on file    Zay of Food in the Last Year: Not on file Transportation Needs:     Lack of Transportation (Medical): Not on file    Lack of Transportation (Non-Medical): Not on file   Physical Activity:     Days of Exercise per Week: Not on file    Minutes of Exercise per Session: Not on file   Stress:     Feeling of Stress : Not on file   Social Connections:     Frequency of Communication with Friends and Family: Not on file    Frequency of Social Gatherings with Friends and Family: Not on file    Attends Bahai Services: Not on file    Active Member of [address] [place] [address] [employer] or Organizations: Not on file    Attends Club or Organization Meetings: Not on file    Marital Status: Not on file   Intimate Partner Violence:     Fear of Current or Ex-Partner: Not on file    Emotionally Abused: Not on file    Physically Abused: Not on file    Sexually Abused: Not on file   Housing Stability:     Unable to Pay for Housing in the Last Year: Not on file    Number of [place] in the Last Year: Not on file    Unstable Housing in the Last Year: Not on file       Current Outpatient Medications on File Prior to Visit   Medication Sig Dispense Refill    hydroCHLOROthiazide (HYDRODIURIL) 25 mg tablet Take 25 mg by mouth nightly.  escitalopram oxalate (Lexapro) 20 mg tablet Take 20 mg by mouth nightly.  atenoloL (TENORMIN) 25 mg tablet Take 25 mg by mouth nightly. No current facility-administered medications on file prior to visit. Allergies   Allergen Reactions    Latex Hives    Aleve [Naproxen Sodium] Hives           ROS      Physical Exam  Exam conducted with a chaperone present. Constitutional:       Appearance: She is well-developed. She is not diaphoretic. HENT:      Head: Normocephalic and atraumatic. Right Ear: External ear normal.      Left Ear: External ear normal.   Eyes:      General: No scleral icterus. Right eye: No discharge. Left eye: No discharge. Pupils: Pupils are equal, round, and reactive to light.    Neck: Thyroid: No thyromegaly. Vascular: No JVD. Trachea: No tracheal deviation. Cardiovascular:      Rate and Rhythm: Normal rate and regular rhythm. Heart sounds: Normal heart sounds. Pulmonary:      Effort: Pulmonary effort is normal. No tachypnea, accessory muscle usage or respiratory distress. Breath sounds: Normal breath sounds. No stridor. Chest:   Breasts: Breasts are symmetrical.      Right: No inverted nipple, mass, nipple discharge, skin change or tenderness. Left: No inverted nipple, mass, nipple discharge, skin change or tenderness. Abdominal:      General: There is no distension. Palpations: Abdomen is soft. There is no mass. Tenderness: There is no abdominal tenderness. Musculoskeletal:         General: Normal range of motion. Cervical back: Normal range of motion and neck supple. Lymphadenopathy:      Cervical: No cervical adenopathy. Skin:     General: Skin is warm and dry. Neurological:      Mental Status: She is alert and oriented to person, place, and time. Psychiatric:         Speech: Speech normal.         Behavior: Behavior normal.         Thought Content: Thought content normal.         Judgment: Judgment normal.         ASSESSMENT and PLAN    ICD-10-CM ICD-9-CM    1. Normal breast exam  Z00.00 V70.9      Patient presents for f/u s/p LEFT breast ductal excision on 05/26/22, and is doing well overall. Upon physical examination noted well healed. Pathology results were reviewed. Patient was informed to do yearly mammograms and follow up as needed. This plan was reviewed with the patient and patient agrees. All questions were answered.       Written by Laine Lopez, as dictated by Dr. Gilberto Nagy MD.

## 2022-07-01 ENCOUNTER — OFFICE VISIT (OUTPATIENT)
Dept: SURGERY | Age: 57
End: 2022-07-01
Payer: COMMERCIAL

## 2022-07-01 VITALS — WEIGHT: 252 LBS | BODY MASS INDEX: 39.55 KG/M2 | HEIGHT: 67 IN

## 2022-07-01 DIAGNOSIS — Z00.00 NORMAL BREAST EXAM: Primary | ICD-10-CM

## 2022-07-01 PROCEDURE — 99024 POSTOP FOLLOW-UP VISIT: CPT | Performed by: SURGERY

## 2022-07-01 NOTE — PROGRESS NOTES
HISTORY OF PRESENT ILLNESS  Shahzad Anglin is a 65 y.o. female. HPI ESTABLISHED Patient here for post op follow up LEFT breast ductal excision 22. Denies pain or changes to the breast area.      Pathology-   FINAL PATHOLOGIC DIAGNOSIS         Endometrium, curettage:        Fragments of benign endocervical polyp   Fragments suggestive of benign endometrial polyp   Fragments of benign proliferative endometrium     Family History: Maternal aunt, breast cancer, dx 72,   Mother, colon cancer, dx 54,      [address] [place] [address], 23, BIRADS 2, LEFT subareolar abscess @ 6:00    ROS    Physical Exam    ASSESSMENT and PLAN  {ASSESSMENT/PLAN:71011}

## 2022-10-11 ENCOUNTER — OFFICE VISIT (OUTPATIENT)
Dept: INTERNAL MEDICINE CLINIC | Age: 57
End: 2022-10-11
Payer: COMMERCIAL

## 2022-10-11 VITALS
DIASTOLIC BLOOD PRESSURE: 91 MMHG | OXYGEN SATURATION: 98 % | TEMPERATURE: 97.3 F | WEIGHT: 243 LBS | RESPIRATION RATE: 16 BRPM | BODY MASS INDEX: 36.83 KG/M2 | HEART RATE: 62 BPM | SYSTOLIC BLOOD PRESSURE: 164 MMHG | HEIGHT: 68 IN

## 2022-10-11 DIAGNOSIS — I10 PRIMARY HYPERTENSION: Primary | ICD-10-CM

## 2022-10-11 DIAGNOSIS — M25.373 UNSTABLE ANKLE, UNSPECIFIED LATERALITY: ICD-10-CM

## 2022-10-11 DIAGNOSIS — R19.7 DIARRHEA, UNSPECIFIED TYPE: ICD-10-CM

## 2022-10-11 DIAGNOSIS — Z12.31 ENCOUNTER FOR SCREENING MAMMOGRAM FOR MALIGNANT NEOPLASM OF BREAST: ICD-10-CM

## 2022-10-11 DIAGNOSIS — F41.9 ANXIETY: ICD-10-CM

## 2022-10-11 DIAGNOSIS — E66.9 CLASS 2 OBESITY WITHOUT SERIOUS COMORBIDITY IN ADULT, UNSPECIFIED BMI, UNSPECIFIED OBESITY TYPE: ICD-10-CM

## 2022-10-11 DIAGNOSIS — Z00.00 WELL ADULT ON ROUTINE HEALTH CHECK: ICD-10-CM

## 2022-10-11 DIAGNOSIS — Z23 NEEDS FLU SHOT: ICD-10-CM

## 2022-10-11 DIAGNOSIS — G47.33 OSA (OBSTRUCTIVE SLEEP APNEA): ICD-10-CM

## 2022-10-11 PROBLEM — E66.812 CLASS 2 OBESITY WITHOUT SERIOUS COMORBIDITY IN ADULT, UNSPECIFIED BMI, UNSPECIFIED OBESITY TYPE: Status: ACTIVE | Noted: 2022-10-11

## 2022-10-11 PROCEDURE — 90686 IIV4 VACC NO PRSV 0.5 ML IM: CPT | Performed by: STUDENT IN AN ORGANIZED HEALTH CARE EDUCATION/TRAINING PROGRAM

## 2022-10-11 PROCEDURE — 99204 OFFICE O/P NEW MOD 45 MIN: CPT | Performed by: STUDENT IN AN ORGANIZED HEALTH CARE EDUCATION/TRAINING PROGRAM

## 2022-10-11 PROCEDURE — 90471 IMMUNIZATION ADMIN: CPT | Performed by: STUDENT IN AN ORGANIZED HEALTH CARE EDUCATION/TRAINING PROGRAM

## 2022-10-11 RX ORDER — AMLODIPINE BESYLATE 5 MG/1
5 TABLET ORAL DAILY
Qty: 30 TABLET | Refills: 3 | Status: SHIPPED | OUTPATIENT
Start: 2022-10-11

## 2022-10-11 RX ORDER — NICOTINE POLACRILEX 2 MG
1 GUM BUCCAL DAILY
COMMUNITY

## 2022-10-11 RX ORDER — PROPRANOLOL HYDROCHLORIDE 10 MG/1
10 TABLET ORAL
Qty: 15 TABLET | Refills: 0 | Status: SHIPPED | OUTPATIENT
Start: 2022-10-11

## 2022-10-11 NOTE — ASSESSMENT & PLAN NOTE
Uncontrolled. Cont HCTZ. Change atenolol to amlodipine 5mg. Continue to check BP at home.  Check CMP today

## 2022-10-11 NOTE — PROGRESS NOTES
Pt. Is here to establish care. Arnel Kaiser  is a 65 y.o. female  who present for routine immunizations. Prior to vaccine administration: Consent was obtained. Risks and adverse reactions were discussed. The patient was provided the VIS and they were given an opportunity to ask questions; all questions were addressed. She  denies any symptoms, reactions or allergies that would exclude them from being immunized today. There were no adverse reactions observed post vaccination. Patient was advised to seek medical or call the office with any questions or concerns post vaccination. Patient verbalized understanding.    Marisabel Raymundo LPN

## 2022-10-11 NOTE — ASSESSMENT & PLAN NOTE
Possibly sub-optimal controlled. Will continue lexapro. Will use PRN Propranolol for anxiety since she is stopping the atenolol. I encouraged her to establish with a therapist. We may try buspar in the future.

## 2022-10-11 NOTE — ASSESSMENT & PLAN NOTE
Suspect bile acid diarrhea. Will try cholestyramine. Will get records from her GI in [address] [facility]. Given referal to local GI, she may need this if we can't get to the bottom of her diarrhea.

## 2022-10-11 NOTE — PROGRESS NOTES
Carol Salomon is a 65y.o. year old female who is a new patient to me today (10/11/22). She was previous followed by NP Claudette Root in [address] [place] [address]. Assessment & Plan:   1. Primary hypertension  Assessment & Plan:  Uncontrolled. Cont HCTZ. Change atenolol to amlodipine 5mg. Continue to check BP at home. Check CMP today  Orders:  -     amLODIPine (NORVASC) 5 mg tablet; Take 1 Tablet by mouth daily. , Normal, Disp-30 Tablet, R-3  2. Anxiety  Assessment & Plan:  Possibly sub-optimal controlled. Will continue lexapro. Will use PRN Propranolol for anxiety since she is stopping the atenolol. I encouraged her to establish with a therapist. We may try buspar in the future. Orders:  -     propranoloL (INDERAL) 10 mg tablet; Take 1 Tablet by mouth daily as needed for Anxiety., Normal, Disp-15 Tablet, R-0  3. Diarrhea, unspecified type  Assessment & Plan:   Suspect bile acid diarrhea. Will try cholestyramine. Will get records from her GI in [facility]. Given referal to local GI, she may need this if we can't get to the bottom of her diarrhea. Orders:  -     REFERRAL TO GASTROENTEROLOGY  -     Cholestyramine-Aspartame 4 gram powder; Take 1 g by mouth two (2) times daily (with meals). , Normal, Disp-201.6 g, R-1  4. MISSY (obstructive sleep apnea)  Comments:  non-restorative sleep, excesssive sleepiness. Will refer to sleep medicine for MISSY testing. Orders:  -     SLEEP MEDICINE REFERRAL  5. Unstable ankle, unspecified laterality  Comments:  reports her ankle gives out. will refer to PT for strengthening  Orders:  -     REFERRAL TO PHYSICAL THERAPY  6. Class 2 obesity without serious comorbidity in adult, unspecified BMI, unspecified obesity type  Comments:  screen for metabolic disorder  Orders:  -     LIPID PANEL; Future  -     METABOLIC PANEL, COMPREHENSIVE; Future  -     HEMOGLOBIN A1C WITH EAG; Future  7. Well adult on routine health check  -     LIPID PANEL; Future  -     METABOLIC PANEL, COMPREHENSIVE;  Future  -     CBC WITH AUTOMATED DIFF; Future  8. Encounter for screening mammogram for malignant neoplasm of breast  -     JEMAL MAMMO BI SCREENING INCL CAD; Future  9. Needs flu shot  -     INFLUENZA, FLUARIX, FLULAVAL, FLUZONE (AGE 6 MO+), AFLURIA(AGE 3Y+) IM, PF, 0.5 ML       Health Maintenance   Flu vaccine: will get today  COVID vaccine:  Tetanus vaccine:  Shingles vaccine:  Pneumonia vaccine:  Cervical cancer screening:  Breast cancer screening: due 1/2023  Colon cancer screening: requesting records  DEXA: @65  Hep C: will check with next labs  Lipid: check today, Fhx CAD  DM: check today, Fhx CAD  Healthcare decision maker:  ACP:      RTC: 2 mo    Subjective:   Davion Piedra was seen today for Establish Care    HTN - taking HCTZ, atenolol. Missed 2 doses this weekend because she went out of town and left her meds at home. She did take the meds yesterday. She didn't take any meds today because she takes them in there evening. She checks BP at home, 140. Anxiety - on lexapro, feels like it works \"ok\". Anxiety began about 8 years ago. She has never spoken with a therapist before. Sleep - She reports she sleeps a lot, can lay in bed all day and actually be sleeping. She does snore. Diarrhea - has episodes of loose stools 3-4x/week. She had a colonoscopy and a workup for the diarrhea 3-4 years ago with LEMUEL Red . These episodes have been going on for 8 years. She feels the episodes are triggered by soda or wine. No stomach pain when it happens but she will feel nauseous. She wants to see GI because she is incontinence of stool when this happens. Has not tried imodium. No blood in stool     \"Brain fog\" - she at times has issues with recall of pronouns     Feels like her ankle is weak, at times it will give out. It is sore at times. It is only the L.  Sometimes when she has a massage     Breast abscess w/fistula tract s/p excision buy Dr Bree Novak  - annual mammogram     Postmenopausal bleeding - GYN D&C    Care team:  GYN       Review of Systems   All other systems reviewed and are negative. PMHx    Patient Active Problem List   Diagnosis Code    Anxiety F41.9    Hypertension I10    Class 2 obesity without serious comorbidity in adult, unspecified BMI, unspecified obesity type E66.9    Diarrhea, unspecified type R19.7         Past Medical History:   Diagnosis Date    Anxiety     Hypertension        Prior to Admission medications    Medication Sig Start Date End Date Taking? Authorizing Provider   biotin 1 mg cap Take 1 Capsule by mouth daily. Yes Provider, Historical   CHOLECALCIFEROL, VITAMIN D3, PO Take 2,000 Units by mouth daily. Yes Provider, Historical   amLODIPine (NORVASC) 5 mg tablet Take 1 Tablet by mouth daily. 10/11/22  Yes Charlie Delgado MD   propranoloL (INDERAL) 10 mg tablet Take 1 Tablet by mouth daily as needed for Anxiety. 10/11/22  Yes Charlie Delgado MD   Cholestyramine-Aspartame 4 gram powder Take 1 g by mouth two (2) times daily (with meals). 10/11/22  Yes Charlie Delgado MD   hydroCHLOROthiazide (HYDRODIURIL) 25 mg tablet Take 25 mg by mouth daily. Yes Provider, Historical   escitalopram oxalate (LEXAPRO) 20 mg tablet Take 20 mg by mouth nightly. Yes Provider, Historical   atenoloL (TENORMIN) 25 mg tablet Take 25 mg by mouth nightly.   10/11/22  Provider, Historical       PSHx    Past Surgical History:   Procedure Laterality Date    HX BREAST LUMPECTOMY Left 05/26/2022    LEFT BREAST DUCTAL EXCISION performed by Micaela Carvalho MD at [facility] AMBULATORY OR    HX BREAST REDUCTION      HX CHOLECYSTECTOMY      HX GYN      Fibroid embolization    HX WISDOM TEETH EXTRACTION         FH    Family History   Problem Relation Age of Onset    Hypertension Mother     Cancer Mother         COLON    Heart Disease Father     Heart Attack Father     Hypertension Father     Cancer Sister         LUNG    Heart Attack Brother     Heart Disease Brother     Heart Attack Brother     Heart Disease Brother     Breast Cancer Maternal Aunt 79    No Known Problems Son     Anesth Problems Neg Hx         SH    Social History     Occupational History    Occupation:    Tobacco Use    Smoking status: Never    Smokeless tobacco: Never   Vaping Use    Vaping Use: Never used   Substance and Sexual Activity    Alcohol use: Yes     Alcohol/week: 7.0 standard drinks     Types: 7 Standard drinks or equivalent per week     Comment: 1 glass of wine per day    Drug use: Yes     Types: Marijuana    Sexual activity: Not on file        The following sections were reviewed & updated as appropriate: Problem List, Allergies, PMH, PSH, FH, and SH. Objective:   Visit Vitals  BP (!) 164/91 (BP 1 Location: Left upper arm, BP Patient Position: Sitting, BP Cuff Size: Large adult)   Pulse 62   Temp 97.3 °F (36.3 °C) (Temporal)   Resp 16   Ht 5' 7.6\" (1.717 m)   Wt 243 lb (110.2 kg)   SpO2 98%   BMI 37.39 kg/m²       Physical Exam  Constitutional:       Appearance: She is obese. Eyes:      Extraocular Movements: Extraocular movements intact. Cardiovascular:      Rate and Rhythm: Normal rate and regular rhythm. Heart sounds: No murmur heard. Pulmonary:      Effort: Pulmonary effort is normal. No respiratory distress. Abdominal:      General: Bowel sounds are normal.      Palpations: Abdomen is soft. Musculoskeletal:      Right lower leg: No edema. Left lower leg: No edema. Neurological:      General: No focal deficit present. Mental Status: She is alert.    Psychiatric:         Mood and Affect: Mood normal.         Behavior: Behavior normal.            Livia Alcazar MD

## 2022-10-12 PROBLEM — E78.5 HYPERLIPIDEMIA: Status: ACTIVE | Noted: 2022-10-12

## 2022-10-12 PROBLEM — R73.03 PREDIABETES: Status: ACTIVE | Noted: 2022-10-12

## 2022-10-13 ENCOUNTER — PATIENT MESSAGE (OUTPATIENT)
Dept: INTERNAL MEDICINE CLINIC | Age: 57
End: 2022-10-13

## 2022-10-13 ENCOUNTER — TELEPHONE (OUTPATIENT)
Dept: INTERNAL MEDICINE CLINIC | Age: 57
End: 2022-10-13

## 2022-10-13 ENCOUNTER — TELEPHONE (OUTPATIENT)
Dept: SLEEP MEDICINE | Age: 57
End: 2022-10-13

## 2022-10-13 NOTE — TELEPHONE ENCOUNTER
Reason for call:  Patient is returning a call to [place].   Please call patient    Is this a new problem: yes     Date of last appointment:  10/11/2022     Can we respond via [facility] [employer]: no    Best call back number:    Minda Christieomaravila (Self) [phone] (Home)

## 2022-10-19 ENCOUNTER — PATIENT MESSAGE (OUTPATIENT)
Dept: SLEEP MEDICINE | Age: 57
End: 2022-10-19

## 2022-10-21 ENCOUNTER — PATIENT MESSAGE (OUTPATIENT)
Dept: INTERNAL MEDICINE CLINIC | Age: 57
End: 2022-10-21

## 2022-10-21 ENCOUNTER — PATIENT MESSAGE (OUTPATIENT)
Dept: SLEEP MEDICINE | Age: 57
End: 2022-10-21

## 2023-01-20 DIAGNOSIS — F41.9 ANXIETY: ICD-10-CM

## 2023-01-20 DIAGNOSIS — I10 PRIMARY HYPERTENSION: Primary | ICD-10-CM

## 2023-01-20 RX ORDER — ESCITALOPRAM OXALATE 20 MG/1
20 TABLET ORAL
Qty: 90 TABLET | Refills: 3 | Status: SHIPPED | OUTPATIENT
Start: 2023-01-20

## 2023-01-20 RX ORDER — HYDROCHLOROTHIAZIDE 25 MG/1
25 TABLET ORAL DAILY
Qty: 90 TABLET | Refills: 1 | Status: SHIPPED | OUTPATIENT
Start: 2023-01-20

## 2023-01-20 NOTE — TELEPHONE ENCOUNTER
Patient is out of both of these medications. Requested Prescriptions     Pending Prescriptions Disp Refills    hydroCHLOROthiazide (HYDRODIURIL) 25 mg tablet       Sig: Take 1 Tablet by mouth daily. escitalopram oxalate (LEXAPRO) 20 mg tablet       Sig: Take 1 Tablet by mouth nightly.      [address] [place], [address] [facility] [phone]

## 2023-01-31 ENCOUNTER — OFFICE VISIT (OUTPATIENT)
Dept: INTERNAL MEDICINE CLINIC | Age: 58
End: 2023-01-31
Payer: COMMERCIAL

## 2023-01-31 VITALS
DIASTOLIC BLOOD PRESSURE: 87 MMHG | OXYGEN SATURATION: 100 % | HEIGHT: 68 IN | SYSTOLIC BLOOD PRESSURE: 131 MMHG | RESPIRATION RATE: 20 BRPM | WEIGHT: 226 LBS | TEMPERATURE: 97.3 F | BODY MASS INDEX: 34.25 KG/M2 | HEART RATE: 63 BPM

## 2023-01-31 DIAGNOSIS — E66.9 CLASS 2 OBESITY WITHOUT SERIOUS COMORBIDITY IN ADULT, UNSPECIFIED BMI, UNSPECIFIED OBESITY TYPE: ICD-10-CM

## 2023-01-31 DIAGNOSIS — R73.03 PREDIABETES: ICD-10-CM

## 2023-01-31 DIAGNOSIS — Z71.89 ACP (ADVANCE CARE PLANNING): ICD-10-CM

## 2023-01-31 DIAGNOSIS — F41.9 ANXIETY: ICD-10-CM

## 2023-01-31 DIAGNOSIS — E78.2 MIXED HYPERLIPIDEMIA: ICD-10-CM

## 2023-01-31 DIAGNOSIS — R19.7 DIARRHEA, UNSPECIFIED TYPE: ICD-10-CM

## 2023-01-31 DIAGNOSIS — Z12.11 COLON CANCER SCREENING: ICD-10-CM

## 2023-01-31 DIAGNOSIS — I10 PRIMARY HYPERTENSION: Primary | ICD-10-CM

## 2023-01-31 PROCEDURE — 99214 OFFICE O/P EST MOD 30 MIN: CPT | Performed by: STUDENT IN AN ORGANIZED HEALTH CARE EDUCATION/TRAINING PROGRAM

## 2023-01-31 NOTE — ASSESSMENT & PLAN NOTE
Congratulated on weight loss, continue diet modification. Agree with plan to start working out as well.

## 2023-01-31 NOTE — PROGRESS NOTES
Nancy Pope is a 63y.o. year old female who presents today (23) for follow-up. Assessment & Plan:   1. Primary hypertension  Assessment & Plan:  Controlled, continue amlodipine and HCTZ. Fatigue improved off atenolol   2. Anxiety  Assessment & Plan:  Controlled on lexapro, continue. Not using propranolol much, did not find it effective. Can not increase dose due to risk of bradycardia. Encouraged to establish with therapist for coping skills - loss of brother, separation from partner. She is actually doing very well despite these challenges  3. Prediabetes  Assessment & Plan:  Suspect A1c would be in normal range now that she lost weight. Will repeat annually. 4. Class 2 obesity without serious comorbidity in adult, unspecified BMI, unspecified obesity type  Assessment & Plan:  Congratulated on weight loss, continue diet modification. Agree with plan to start working out as well. 5. Mixed hyperlipidemia  Assessment & Plan:  ASCVD risk 8.7%. Discussed starting statin, will hold off for now. She is interested in supplements, can try red yeast rice. 6. Diarrhea, unspecified type  Assessment & Plan:  Resolved after stopping wine and diet soda. 7. Colon cancer screening  -     REFERRAL TO GASTROENTEROLOGY  8.  ACP (advance care planning)  -     REFERRAL TO ACP CLINICAL SPECIALIST       Health Maintenance   Flu vaccine: 10/2022  COVID vaccine: due, discussed  Tetanus vaccine:   Shingles vaccine: UTD  Pneumonia vaccine:  Cervical cancer screenin2022 through GYN  Breast cancer screening: due, ordered   Colon cancer screening: DUE, will refer   DEXA:  Hep C: will check with next labs  Lipid: 10/2022  DM: 10/2022 5.7%  Healthcare decision maker: wants this to be sister instead of her son  ACP: referred      RTC: 10/2023 for annual    Subjective:   Diane Gerber was seen today for Pre-diabetes, Hypertension, and Hyperlipidemia    Weight loss  - she is using redBus.inPal to count calories - 1400 naeem/day  - she lost about 15 lbs  - her goal is to start exercise. She plans to walk. She also likes water aerobics or regular aerorbics classes     HTN   - HCTZ, atenolol changed to amlodipine last visit  - home -140/high 80s. Anxiety  - feels anxiety is \"controllable' but she is under increased stress, lost her brother in Dec and ending a personal relationship.  - when her brother passed away she felt some thumping in her chest so she took propranolol but it didn't help much. - taking dslexparo    Pre-DM - new diagnosis    Lipid -   The 10-year ASCVD risk score (Puja PARK, et al., 2019) is: 8.7%    Values used to calculate the score:      Age: 63 years      Sex: Female      Is Non- : Yes      Diabetic: No      Tobacco smoker: No      Systolic Blood Pressure: 194 mmHg      Is BP treated: Yes      HDL Cholesterol: 39 MG/DL      Total Cholesterol: 221 MG/DL    Diarrhea  - suspect due to bile acid, trial of cholestyramine last visit. she took it once  - she cut out wine and soda and diarrhea resolved    Sleep  - she notes increased energy since stopping atenolol  - she did not make appointment with sleep medicine for MISSY testing    Review of Systems   All other systems reviewed and are negative. PMHx    Patient Active Problem List   Diagnosis Code    Anxiety F41.9    Hypertension I10    Class 2 obesity without serious comorbidity in adult, unspecified BMI, unspecified obesity type E66.9    Hyperlipidemia E78.5    Prediabetes R73.03       Prior to Admission medications    Medication Sig Start Date End Date Taking? Authorizing Provider   hydroCHLOROthiazide (HYDRODIURIL) 25 mg tablet Take 1 Tablet by mouth daily. 1/20/23  Yes Mango Barkley MD   escitalopram oxalate (LEXAPRO) 20 mg tablet Take 1 Tablet by mouth nightly. 1/20/23  Yes Mango Brakley MD   biotin 1 mg cap Take 1 Capsule by mouth daily.    Yes Provider, Historical   CHOLECALCIFEROL, VITAMIN D3, PO Take 2,000 Units by mouth daily. Yes Provider, Historical   amLODIPine (NORVASC) 5 mg tablet Take 1 Tablet by mouth daily. 10/11/22  Yes Toni Torre MD   propranoloL (INDERAL) 10 mg tablet Take 1 Tablet by mouth daily as needed for Anxiety. 10/11/22  Yes Toni Torre MD   Cholestyramine-Aspartame 4 gram powder Take 1 g by mouth two (2) times daily (with meals). 10/11/22 1/31/23  Toni Torre MD       The following sections were reviewed & updated as appropriate: Problem List, Allergies, PMH, PSH, FH, and SH. Objective:   Visit Vitals  /87   Pulse 63   Temp 97.3 °F (36.3 °C) (Tympanic)   Resp 20   Ht 5' 7.5\" (1.715 m)   Wt 226 lb (102.5 kg)   SpO2 100%   BMI 34.87 kg/m²       Physical Exam  Eyes:      Extraocular Movements: Extraocular movements intact. Cardiovascular:      Rate and Rhythm: Normal rate and regular rhythm. Heart sounds: No murmur heard. Pulmonary:      Effort: Pulmonary effort is normal. No respiratory distress. Abdominal:      General: Bowel sounds are normal.      Palpations: Abdomen is soft. Musculoskeletal:      Right lower leg: No edema. Left lower leg: No edema. Neurological:      General: No focal deficit present. Mental Status: She is alert.    Psychiatric:         Mood and Affect: Mood normal.         Behavior: Behavior normal.            Sara Delong MD

## 2023-01-31 NOTE — PATIENT INSTRUCTIONS
Counseling services: [facility] - several locations in [address] [place] [address] [address]: [phone] several locations in [place] and see who is in network    Red yeast rice for cholesterol. Please obtain the following vaccines from your local pharmacy. Please ask your pharmacy to fax our office a copy of the vaccination record.  Our fax number is [phone].   - COVID Bivalent booster     Please call our scheduling  @ [phone] to schedule your mammogram

## 2023-01-31 NOTE — ASSESSMENT & PLAN NOTE
ASCVD risk 8.7%. Discussed starting statin, will hold off for now. She is interested in supplements, can try red yeast rice.

## 2023-01-31 NOTE — ASSESSMENT & PLAN NOTE
Controlled on lexapro, continue. Not using propranolol much, did not find it effective. Can not increase dose due to risk of bradycardia. Encouraged to establish with therapist for coping skills - loss of brother, separation from partner.  She is actually doing very well despite these challenges

## 2023-02-01 ENCOUNTER — PATIENT OUTREACH (OUTPATIENT)
Dept: CASE MANAGEMENT | Age: 58
End: 2023-02-01

## 2023-02-03 DIAGNOSIS — I10 PRIMARY HYPERTENSION: ICD-10-CM

## 2023-02-03 RX ORDER — AMLODIPINE BESYLATE 5 MG/1
5 TABLET ORAL DAILY
Qty: 90 TABLET | Refills: 3 | Status: SHIPPED | OUTPATIENT
Start: 2023-02-03

## 2023-02-03 NOTE — TELEPHONE ENCOUNTER
Requested Prescriptions     Pending Prescriptions Disp Refills    amLODIPine (NORVASC) 5 mg tablet 30 Tablet 3     Sig: Take 1 Tablet by mouth daily.      [facility], [address] [phone]

## 2023-02-14 ENCOUNTER — PATIENT OUTREACH (OUTPATIENT)
Dept: CASE MANAGEMENT | Age: 58
End: 2023-02-14

## 2023-02-14 NOTE — ACP (ADVANCE CARE PLANNING)
Advance Care Planning   Ambulatory ACP Specialist Patient Outreach    Date:  2/14/2023    ACP Specialist:  Candi Goel    Outreach call to patient in follow-up to ACP Specialist referral from:  Mir Schilling MD    [x] PCP  [] Provider   [] Ambulatory Care Management [] Other     For:                  [x] Advance Directive Assistance              [] Complete Portable DNR order              [] Complete POST/MOST              [] Code Status Discussion             [] Discuss Goals of Care             [] Early ACP Decision-Making              [] Other (Specify)    Date Referral Received:  1/31/2023    Today's Outreach:  [] First   [x] Second  [x] Third       Third outreach made by: [x] Phone  [] Email / mail    [] MyChart     Intervention:  [] Spoke with Patient   [] Left VM requesting return call      Outcome:  Second ACP outreach was made on 2/8/23 where Ulysses Fails left message requesting return call. Third ACP outreach was made by phone on 2/14/23. Writer spoke to patient and patient scheduled a conversation with ACP Specialist Audra Bejarano for Tuesday, February 21, 2023 at 1:00 PM.   A copy of [facility] AMD and ACP information sheets sent to patient's confirmed email address on file. Next Step:   [x] ACP scheduled conversation  [] Outreach again in one week               [x] Email / Mail ACP Info Sheets  [x] Email / Mail Advance Directive   [] Closing referral.  Routing closure to referring provider/staff and to ACP Specialist . [] Closure letter mailed to patient with invitation to contact ACP Specialist if / when ready.   Thank you for this referral.

## 2023-02-21 ENCOUNTER — DOCUMENTATION ONLY (OUTPATIENT)
Dept: CASE MANAGEMENT | Age: 58
End: 2023-02-21

## 2023-02-21 NOTE — ACP (ADVANCE CARE PLANNING)
Advance Care Planning     Advance Care Planning Clinical Specialist  Conversation Note      Date of ACP Conversation: 02/21/23    Conversation Conducted with:  Patient with Decision Making Capacity    ACP Clinical Specialist: Johanne Motley, [address] [facility] Decision Maker:    Current Designated Health Care Decision Maker:   Pt has not named her health care decision maker at this time. Care Preferences    Hospitalization: \"If your health worsens and it becomes clear that your chance of recovery is unlikely, what would your preference be regarding hospitalization? \"    Choice:  []  The patient wants hospitalization  [x]  The patient prefers comfort-focused treatment without hospitalization. Ventilation: \"If you were in your present state of health and suddenly became very ill and were unable to breathe on your own, what would your preference be about the use of a ventilator (breathing machine) if it were available to you? \"      If patient would desire the use of a ventilator (breathing machine), answer \"yes\", if not \"no\":yes    \"If your health worsens and it becomes clear that your chance of recovery is unlikely, what would your preference be about the use of a ventilator (breathing machine) if it were available to you? \"     Would the patient desire the use of a ventilator (breathing machine)? NO      Resuscitation  \"CPR works best to restart the heart when there is a sudden event, like a heart attack, in someone who is otherwise healthy. Unfortunately, CPR does not typically restart the heart for people who have serious health conditions or who are very sick. \"    \"In the event your heart stopped as a result of an underlying serious health condition, would you want attempts to be made to restart your heart (answer \"yes\" for attempt to resuscitate) or would you prefer a natural death (answer \"no\" for do not attempt to resuscitate)? \" yes      [x] Yes  [] No   Educated Patient / Decision Maker regarding differences between Advance Directives and portable DNR orders. Length of ACP Conversation in minutes:  39    Conversation Outcomes:  [x] ACP discussion completed  [] Existing advance directive reviewed with patient; no changes to patient's previously recorded wishes   [x] New Advance Directive completed; patient wanted to complete on her own after reviewing with this ACP Specialist    [] Portable Do Not Resuscitate prepared for Provider review and signature  [] POLST/POST/MOLST/MOST prepared for Provider review and signature      Follow-up plan:    [] Schedule follow-up conversation to continue planning  [] Referred individual to Provider for additional questions/concerns   [x] Advised patient/agent/surrogate to review completed ACP document and update if needed with changes in condition, patient preferences or care setting     [x] This note routed to one or more involved healthcare providers    ACP Specialist met with patient for ACP discussion and to provide assistance with completion of her [facility] AMD.  Pt presented as alert and oriented throughout this entire discussion and provided consistent answers regarding her medical decisions and wishes. Pt states that if her health should decline to the point of no hope for recovery, she would prefer comfort focused treatment without hospitalization. She states that in her current condition, she would want the use of a ventilator and CPR. Pt states that she would not want the use of a ventilator for an extended amount of time nor if her health declined to the point of no recovery. ACP Specialist reviewed the [facility] AMD with patient and she indicated that she wanted to complete the document on her own at a later time after she has had more time to think about her medical wishes and decisions regarding who she would want to name as her health care decision makers.      Plan:  Pt states that she will complete [facility] AMD and either upload signed document into her [employer] or scan and send to this ACP Specialist to be uploaded into her medical record. Once signed document is received, this referral will be closed. MIKEL Medrano  Advance Care   [phone]

## 2023-06-28 ENCOUNTER — TELEPHONE (OUTPATIENT)
Age: 58
End: 2023-06-28

## 2023-06-28 RX ORDER — HYDROCHLOROTHIAZIDE 25 MG/1
25 TABLET ORAL DAILY
Qty: 90 TABLET | Refills: 1 | Status: SHIPPED | OUTPATIENT
Start: 2023-06-28

## 2023-08-03 DIAGNOSIS — F41.9 ANXIETY: Primary | ICD-10-CM

## 2023-08-03 RX ORDER — PROPRANOLOL HYDROCHLORIDE 10 MG/1
10 TABLET ORAL DAILY PRN
Qty: 30 TABLET | Refills: 1 | Status: SHIPPED | OUTPATIENT
Start: 2023-08-03

## 2023-08-03 NOTE — TELEPHONE ENCOUNTER
Medication Refill Request    Alex Wong is requesting a refill of the following medication(s):   Propanolol 10 mg    Please send refill to:   [id number]  [facility], [address] [place]  [phone]

## 2023-08-03 NOTE — TELEPHONE ENCOUNTER
Pt last seen on 1/31/23. Due to return in October. Has appt on 10/17/23 . Rx last filled on 10/11/22 #15/0RF. Will forward to MD for refill.

## 2023-09-22 ENCOUNTER — TELEPHONE (OUTPATIENT)
Age: 58
End: 2023-09-22

## 2023-09-22 DIAGNOSIS — F41.9 ANXIETY: ICD-10-CM

## 2023-09-22 RX ORDER — PROPRANOLOL HYDROCHLORIDE 10 MG/1
10 TABLET ORAL DAILY PRN
Qty: 30 TABLET | Refills: 1 | Status: SHIPPED | OUTPATIENT
Start: 2023-09-22

## 2023-09-22 NOTE — TELEPHONE ENCOUNTER
Pt last seen on 1/30/23. Due to return in October. Has appt on 10/17/23. Rx last filled on 8/3/23 #30/1RF. Rx sent to pharmacy as previously filled and verified by Verbal Order Read Back with provider.

## 2023-09-22 NOTE — TELEPHONE ENCOUNTER
Medication Refill Request    Elena Torres is requesting a refill of the following medication(s):   Propranolol 10 mg tablet Qty:30  [facility] refill to:     [id number] [address] [phone] Gerri Binh [phone]  [facility] [place] [address]  Phone: [phone] Fax: [phone]

## 2023-10-17 ENCOUNTER — OFFICE VISIT (OUTPATIENT)
Age: 58
End: 2023-10-17
Payer: COMMERCIAL

## 2023-10-17 VITALS
BODY MASS INDEX: 38.77 KG/M2 | SYSTOLIC BLOOD PRESSURE: 145 MMHG | TEMPERATURE: 97.2 F | OXYGEN SATURATION: 100 % | RESPIRATION RATE: 20 BRPM | DIASTOLIC BLOOD PRESSURE: 94 MMHG | HEART RATE: 60 BPM | WEIGHT: 247 LBS | HEIGHT: 67 IN

## 2023-10-17 DIAGNOSIS — Z12.11 COLON CANCER SCREENING: ICD-10-CM

## 2023-10-17 DIAGNOSIS — Z00.00 ROUTINE GENERAL MEDICAL EXAMINATION AT A HEALTH CARE FACILITY: Primary | ICD-10-CM

## 2023-10-17 DIAGNOSIS — R73.03 PREDIABETES: ICD-10-CM

## 2023-10-17 DIAGNOSIS — Z11.59 ENCOUNTER FOR HEPATITIS C SCREENING TEST FOR LOW RISK PATIENT: ICD-10-CM

## 2023-10-17 DIAGNOSIS — E66.9 CLASS 2 OBESITY WITHOUT SERIOUS COMORBIDITY IN ADULT, UNSPECIFIED BMI, UNSPECIFIED OBESITY TYPE: ICD-10-CM

## 2023-10-17 DIAGNOSIS — I10 PRIMARY HYPERTENSION: ICD-10-CM

## 2023-10-17 DIAGNOSIS — F41.9 ANXIETY: ICD-10-CM

## 2023-10-17 DIAGNOSIS — Z23 FLU VACCINE NEED: ICD-10-CM

## 2023-10-17 DIAGNOSIS — Z11.4 ENCOUNTER FOR SCREENING FOR HIV: ICD-10-CM

## 2023-10-17 PROCEDURE — 3080F DIAST BP >= 90 MM HG: CPT | Performed by: STUDENT IN AN ORGANIZED HEALTH CARE EDUCATION/TRAINING PROGRAM

## 2023-10-17 PROCEDURE — 99396 PREV VISIT EST AGE 40-64: CPT | Performed by: STUDENT IN AN ORGANIZED HEALTH CARE EDUCATION/TRAINING PROGRAM

## 2023-10-17 PROCEDURE — 90471 IMMUNIZATION ADMIN: CPT | Performed by: STUDENT IN AN ORGANIZED HEALTH CARE EDUCATION/TRAINING PROGRAM

## 2023-10-17 PROCEDURE — 90674 CCIIV4 VAC NO PRSV 0.5 ML IM: CPT | Performed by: STUDENT IN AN ORGANIZED HEALTH CARE EDUCATION/TRAINING PROGRAM

## 2023-10-17 PROCEDURE — 3077F SYST BP >= 140 MM HG: CPT | Performed by: STUDENT IN AN ORGANIZED HEALTH CARE EDUCATION/TRAINING PROGRAM

## 2023-10-17 RX ORDER — PROPRANOLOL HYDROCHLORIDE 20 MG/1
20 TABLET ORAL DAILY PRN
Qty: 30 TABLET | Refills: 0
Start: 2023-10-17

## 2023-10-17 SDOH — ECONOMIC STABILITY: FOOD INSECURITY: WITHIN THE PAST 12 MONTHS, THE FOOD YOU BOUGHT JUST DIDN'T LAST AND YOU DIDN'T HAVE MONEY TO GET MORE.: NEVER TRUE

## 2023-10-17 SDOH — ECONOMIC STABILITY: INCOME INSECURITY: HOW HARD IS IT FOR YOU TO PAY FOR THE VERY BASICS LIKE FOOD, HOUSING, MEDICAL CARE, AND HEATING?: SOMEWHAT HARD

## 2023-10-17 SDOH — ECONOMIC STABILITY: FOOD INSECURITY: WITHIN THE PAST 12 MONTHS, YOU WORRIED THAT YOUR FOOD WOULD RUN OUT BEFORE YOU GOT MONEY TO BUY MORE.: NEVER TRUE

## 2023-10-17 SDOH — ECONOMIC STABILITY: HOUSING INSECURITY
IN THE LAST 12 MONTHS, WAS THERE A TIME WHEN YOU DID NOT HAVE A STEADY PLACE TO SLEEP OR SLEPT IN A SHELTER (INCLUDING NOW)?: NO

## 2023-10-17 ASSESSMENT — ANXIETY QUESTIONNAIRES
7. FEELING AFRAID AS IF SOMETHING AWFUL MIGHT HAPPEN: 0
3. WORRYING TOO MUCH ABOUT DIFFERENT THINGS: 2
GAD7 TOTAL SCORE: 7
6. BECOMING EASILY ANNOYED OR IRRITABLE: 1
1. FEELING NERVOUS, ANXIOUS, OR ON EDGE: 1
4. TROUBLE RELAXING: 1
5. BEING SO RESTLESS THAT IT IS HARD TO SIT STILL: 0
IF YOU CHECKED OFF ANY PROBLEMS ON THIS QUESTIONNAIRE, HOW DIFFICULT HAVE THESE PROBLEMS MADE IT FOR YOU TO DO YOUR WORK, TAKE CARE OF THINGS AT HOME, OR GET ALONG WITH OTHER PEOPLE: VERY DIFFICULT
2. NOT BEING ABLE TO STOP OR CONTROL WORRYING: 2

## 2023-10-17 NOTE — PROGRESS NOTES
Ashwin Moreno is a 63y.o. year old female who presents today (10/17/23) for annual physical exam.    Assessment & Plan:   1. Routine general medical examination at a health care facility  Reviewed diet and exercise habits - she has room for improvement on this. We talked about giving meals some forethought and focusing on lean protein and veggies, avoiding added sugars. We talked about starting regular physical activity for physical health as well as mental health. Updated health maintenance, see below. Check screening labs. -     CBC; Future  -     Comprehensive Metabolic Panel; Future  -     Lipid Panel; Future  2. Primary hypertension  BP a little high in the office, but controlled at home. Continue HCTZ and amlodipine.  -     CBC; Future  3. Anxiety  Somewhat controlled on lexapro and PRN propranolol. She is under increased stress with regards to her sister's health and taking a pay cut a year ago when she moved here. I asked her to establish with a therapist. She prefers to see someone in the community. We talked about the need to develop new coping strategies that do not reply on food  -     propranolol (INDERAL) 20 MG tablet; Take 1 tablet by mouth daily as needed (anxiety), Disp-30 tablet, R-0NO PRINT  4. Prediabetes  She has gained weight recently we talked about diet and exercise. Will update A1c to screen for progression  -     Hemoglobin A1C; Future  5. Encounter for hepatitis C screening test for low risk patient  -     Hepatitis C Antibody; Future  6. Class 2 obesity without serious comorbidity in adult, unspecified BMI, unspecified obesity type  - discussed diet and exercise, see above  7. Encounter for screening for HIV  -     HIV 1/2 Ag/Ab, 4TH Generation,W Rflx Confirm; Future  8. Colon cancer screening  -     [facility] - Tony Lewis MD, Gastroenterology, [place] ([facility])  9.  Flu vaccine need  -     Influenza, FLUCELVAX, (age 10 mo+), IM, Preservative Free, 0.5 mL    Health Maintenance   Flu

## 2023-10-17 NOTE — PATIENT INSTRUCTIONS
Please bring me a copy of your completed advanced directive. Please begin working with a therapist. Call your insurance to see who is in network and taking new patients.  Can try:  [address] [facility]  [facility] Bladder non-tender and non-distended. Urine clear yellow.

## 2023-10-19 ENCOUNTER — PATIENT MESSAGE (OUTPATIENT)
Age: 58
End: 2023-10-19

## 2023-10-25 RX ORDER — ROSUVASTATIN CALCIUM 5 MG/1
5 TABLET, COATED ORAL DAILY
Qty: 90 TABLET | Refills: 1 | Status: SHIPPED | OUTPATIENT
Start: 2023-10-25

## 2024-02-05 ENCOUNTER — TELEPHONE (OUTPATIENT)
Age: 59
End: 2024-02-05

## 2024-02-05 NOTE — TELEPHONE ENCOUNTER
Medication Refill Request    Clare Mon is requesting a refill of the following medication(s):   hydroCHLOROthiazide (HYDRODIURIL) 25 MG tablet   escitalopram (LEXAPRO) 20 MG tablet             Please send refill to:     [id number] - [place], [place] - [address] - P [phone] - F [phone] [address] [facility] [address]  Phone: [phone] Fax: [phone]

## 2024-02-06 RX ORDER — ESCITALOPRAM OXALATE 20 MG/1
20 TABLET ORAL NIGHTLY
Qty: 90 TABLET | Refills: 0 | Status: SHIPPED | OUTPATIENT
Start: 2024-02-06

## 2024-02-06 RX ORDER — HYDROCHLOROTHIAZIDE 25 MG/1
25 TABLET ORAL DAILY
Qty: 90 TABLET | Refills: 0 | Status: SHIPPED | OUTPATIENT
Start: 2024-02-06

## 2024-02-06 NOTE — TELEPHONE ENCOUNTER
Pt last seen on 10/17/23. Due to return in 6 months.    Has appt on 4/19/24.    Rx last filled - HCTZ  6/8/23 and Lexapro 1/20/23.    Rx sent to pharmacy as #90/0RF and verified by Verbal Order Read Back with provider.

## 2024-02-19 RX ORDER — AMLODIPINE BESYLATE 5 MG/1
5 TABLET ORAL DAILY
Qty: 90 TABLET | Refills: 2 | Status: SHIPPED | OUTPATIENT
Start: 2024-02-19

## 2024-02-19 NOTE — TELEPHONE ENCOUNTER
Medication Refill Request    Clare Mon is requesting a refill of the following medication(s):   amLODIPine (NORVASC) 5 MG tablet               Please send refill to:     [id number] - [place] - [address] - P [phone] - F [phone] [address] [facility] [address]  Phone: [phone] Fax: [phone]

## 2024-04-19 ENCOUNTER — OFFICE VISIT (OUTPATIENT)
Age: 59
End: 2024-04-19
Payer: COMMERCIAL

## 2024-04-19 VITALS
SYSTOLIC BLOOD PRESSURE: 125 MMHG | HEART RATE: 69 BPM | TEMPERATURE: 97.1 F | HEIGHT: 67 IN | OXYGEN SATURATION: 98 % | WEIGHT: 252.8 LBS | DIASTOLIC BLOOD PRESSURE: 81 MMHG | BODY MASS INDEX: 39.68 KG/M2 | RESPIRATION RATE: 20 BRPM

## 2024-04-19 DIAGNOSIS — E66.01 SEVERE OBESITY (BMI 35.0-39.9) WITH COMORBIDITY (HCC): ICD-10-CM

## 2024-04-19 DIAGNOSIS — E78.2 MIXED HYPERLIPIDEMIA: ICD-10-CM

## 2024-04-19 DIAGNOSIS — I10 PRIMARY HYPERTENSION: ICD-10-CM

## 2024-04-19 DIAGNOSIS — F41.9 ANXIETY: Primary | ICD-10-CM

## 2024-04-19 DIAGNOSIS — M76.822 POSTERIOR TIBIAL TENDINITIS OF LEFT LOWER EXTREMITY: ICD-10-CM

## 2024-04-19 DIAGNOSIS — M67.02 ACQUIRED SHORT ACHILLES TENDON OF LEFT LOWER EXTREMITY: ICD-10-CM

## 2024-04-19 LAB
CHOLEST SERPL-MCNC: 149 MG/DL
HDLC SERPL-MCNC: 37 MG/DL
HDLC SERPL: 4 (ref 0–5)
LDLC SERPL CALC-MCNC: 37.8 MG/DL (ref 0–100)
TRIGL SERPL-MCNC: 371 MG/DL
VLDLC SERPL CALC-MCNC: 74.2 MG/DL

## 2024-04-19 PROCEDURE — 3079F DIAST BP 80-89 MM HG: CPT | Performed by: STUDENT IN AN ORGANIZED HEALTH CARE EDUCATION/TRAINING PROGRAM

## 2024-04-19 PROCEDURE — 3074F SYST BP LT 130 MM HG: CPT | Performed by: STUDENT IN AN ORGANIZED HEALTH CARE EDUCATION/TRAINING PROGRAM

## 2024-04-19 PROCEDURE — 99214 OFFICE O/P EST MOD 30 MIN: CPT | Performed by: STUDENT IN AN ORGANIZED HEALTH CARE EDUCATION/TRAINING PROGRAM

## 2024-04-19 ASSESSMENT — PATIENT HEALTH QUESTIONNAIRE - PHQ9
SUM OF ALL RESPONSES TO PHQ9 QUESTIONS 1 & 2: 3
SUM OF ALL RESPONSES TO PHQ QUESTIONS 1-9: 14
SUM OF ALL RESPONSES TO PHQ QUESTIONS 1-9: 14
9. THOUGHTS THAT YOU WOULD BE BETTER OFF DEAD, OR OF HURTING YOURSELF: NOT AT ALL
SUM OF ALL RESPONSES TO PHQ QUESTIONS 1-9: 14
2. FEELING DOWN, DEPRESSED OR HOPELESS: MORE THAN HALF THE DAYS
10. IF YOU CHECKED OFF ANY PROBLEMS, HOW DIFFICULT HAVE THESE PROBLEMS MADE IT FOR YOU TO DO YOUR WORK, TAKE CARE OF THINGS AT HOME, OR GET ALONG WITH OTHER PEOPLE: NOT DIFFICULT AT ALL
6. FEELING BAD ABOUT YOURSELF - OR THAT YOU ARE A FAILURE OR HAVE LET YOURSELF OR YOUR FAMILY DOWN: NOT AT ALL
8. MOVING OR SPEAKING SO SLOWLY THAT OTHER PEOPLE COULD HAVE NOTICED. OR THE OPPOSITE, BEING SO FIGETY OR RESTLESS THAT YOU HAVE BEEN MOVING AROUND A LOT MORE THAN USUAL: NOT AT ALL
5. POOR APPETITE OR OVEREATING: NEARLY EVERY DAY
3. TROUBLE FALLING OR STAYING ASLEEP: NEARLY EVERY DAY
1. LITTLE INTEREST OR PLEASURE IN DOING THINGS: SEVERAL DAYS
SUM OF ALL RESPONSES TO PHQ QUESTIONS 1-9: 14
7. TROUBLE CONCENTRATING ON THINGS, SUCH AS READING THE NEWSPAPER OR WATCHING TELEVISION: MORE THAN HALF THE DAYS
4. FEELING TIRED OR HAVING LITTLE ENERGY: NEARLY EVERY DAY

## 2024-04-19 NOTE — ASSESSMENT & PLAN NOTE
Poor control, now with some depressive symptoms as well. These seem to stem from her though that she can not be as independent as she wishes (can't afford her own place). She will continue lexapro, we talked about additional pharmacotherapy but decided to hold off. She will establish with a therapist. She will start exercising regularly, which she has found to alleviate some of her anxiety

## 2024-04-19 NOTE — PATIENT INSTRUCTIONS
Please schedule with a therapist  Please exercise/be active 3 days/week    Therapist options:  [facility]  [facility]  [facility]  [facility]

## 2024-04-19 NOTE — PROGRESS NOTES
Clare Mon is a 59 y.o. year old female who presents today (24) for follow-up.     Assessment & Plan:   1. Anxiety  Assessment & Plan:  Poor control, now with some depressive symptoms as well. These seem to stem from her though that she can not be as independent as she wishes (can't afford her own place). She will continue lexapro, we talked about additional pharmacotherapy but decided to hold off. She will establish with a therapist. She will start exercising regularly, which she has found to alleviate some of her anxiety   2. Primary hypertension  Assessment & Plan:  Controlled, cont amlodipine and HCTZ   3. Mixed hyperlipidemia  Assessment & Plan:  Tolerating crestor. Update lipid panel to assess control   Orders:  -     Lipid Panel; Future  4. Severe obesity (BMI 35.0-39.9) with comorbidity (HCC)  She is dissatisfied with her weight. Encouraged regular exercise.   5. Posterior tibial tendinitis of left lower extremity  6. Acquired short Achilles tendon of left lower extremity  Saw ortho Dr Fitzgerald a couple months ago and was referred to PT but she did not follow through with this. She reports ongoing pain. I will provide another PT referral. Needs to follow-up with Dr Fitzgerald if still having pain after PT  -     External Referral To Physical Therapy         Health Maintenance   Flu vaccine: 10/2023  COVID vaccine: not sure if she will get booster   Tetanus vaccine:    Shingles vaccine: UTD  Pneumonia vaccine:  Cervical cancer screenin2022 through GYN  Breast cancer screenin2023  Colon cancer screening: DUE, has been referred  DEXA:   Hep C: 10/72290  HIV: 10/2023  Lipid: 10/2023 - repeat today  DM: 10/2023 5.5%  Healthcare decision maker: wants this to be sister instead of her son  ACP: referred    RTC: 3 mo mood     Subjective:   Clare was seen today for Hypertension, Hyperlipidemia, and Blood Sugar Problem (Pre Diabetes)    HTN  - amlodipine, HCTZ  - home /84     Anxiety  - lexapro,

## 2024-04-22 NOTE — RESULT ENCOUNTER NOTE
Will send MCM - TG not much different after starting rosuvastatin, consider replacing with fish oil.     The 10-year ASCVD risk score (Jim GATIAN, et al., 2019) is: 5.7%    Values used to calculate the score:      Age: 59 years      Sex: Female      Is Non- : Yes      Diabetic: No      Tobacco smoker: No      Systolic Blood Pressure: 125 mmHg      Is BP treated: Yes      HDL Cholesterol: 37 MG/DL      Total Cholesterol: 149 MG/DL

## 2024-05-23 ENCOUNTER — TELEPHONE (OUTPATIENT)
Age: 59
End: 2024-05-23

## 2024-05-23 RX ORDER — ROSUVASTATIN CALCIUM 5 MG/1
5 TABLET, COATED ORAL DAILY
Qty: 90 TABLET | Refills: 1 | Status: SHIPPED | OUTPATIENT
Start: 2024-05-23

## 2024-05-23 NOTE — TELEPHONE ENCOUNTER
Pt last seen on 4/19/24. Due to return in 3 months.    Has appt on 8/9/24.    Rx last filled on 10/25/23 #90/1RF.    Rx sent to pharmacy as previously filled and verified by Verbal Order Read Back with provider.

## 2024-05-23 NOTE — TELEPHONE ENCOUNTER
Medication Refill Request    Clare Mon is requesting a refill of the following medication(s):   rosuvastatin (CRESTOR) 5 MG tablet             Please send refill to:     [id number] - [place] - [address] - P [phone] - F [phone]  [address] [facility] [address]  Phone: [phone] Fax: [phone]

## 2024-06-28 RX ORDER — ESCITALOPRAM OXALATE 20 MG/1
20 TABLET ORAL NIGHTLY
Qty: 90 TABLET | Refills: 0 | Status: SHIPPED | OUTPATIENT
Start: 2024-06-28

## 2024-06-28 RX ORDER — HYDROCHLOROTHIAZIDE 25 MG/1
25 TABLET ORAL DAILY
Qty: 90 TABLET | Refills: 0 | Status: SHIPPED | OUTPATIENT
Start: 2024-06-28

## 2024-06-28 NOTE — TELEPHONE ENCOUNTER
Pt last seen on 4/19/24. Due to return in 3 months.    Has appt on 8/9/24.    Both Rx last filled on 2/6/24 #90/0RF.    Rx sent to pharmacy as previously filled and verified by Verbal Order Read Back with provider.

## 2024-07-17 ENCOUNTER — OFFICE VISIT (OUTPATIENT)
Age: 59
End: 2024-07-17
Payer: COMMERCIAL

## 2024-07-17 VITALS
RESPIRATION RATE: 20 BRPM | HEART RATE: 61 BPM | WEIGHT: 253.4 LBS | TEMPERATURE: 97.3 F | BODY MASS INDEX: 39.77 KG/M2 | DIASTOLIC BLOOD PRESSURE: 83 MMHG | HEIGHT: 67 IN | OXYGEN SATURATION: 96 % | SYSTOLIC BLOOD PRESSURE: 136 MMHG

## 2024-07-17 DIAGNOSIS — E66.9 CLASS 2 OBESITY WITHOUT SERIOUS COMORBIDITY IN ADULT, UNSPECIFIED BMI, UNSPECIFIED OBESITY TYPE: ICD-10-CM

## 2024-07-17 DIAGNOSIS — S76.011A MUSCLE STRAIN OF RIGHT GLUTEAL REGION, INITIAL ENCOUNTER: Primary | ICD-10-CM

## 2024-07-17 DIAGNOSIS — I10 PRIMARY HYPERTENSION: ICD-10-CM

## 2024-07-17 DIAGNOSIS — E78.5 HYPERLIPIDEMIA, UNSPECIFIED HYPERLIPIDEMIA TYPE: ICD-10-CM

## 2024-07-17 PROCEDURE — 3075F SYST BP GE 130 - 139MM HG: CPT | Performed by: STUDENT IN AN ORGANIZED HEALTH CARE EDUCATION/TRAINING PROGRAM

## 2024-07-17 PROCEDURE — 99214 OFFICE O/P EST MOD 30 MIN: CPT | Performed by: STUDENT IN AN ORGANIZED HEALTH CARE EDUCATION/TRAINING PROGRAM

## 2024-07-17 PROCEDURE — 3079F DIAST BP 80-89 MM HG: CPT | Performed by: STUDENT IN AN ORGANIZED HEALTH CARE EDUCATION/TRAINING PROGRAM

## 2024-07-17 NOTE — PROGRESS NOTES
Pt states woke up 2 weeks ago having difficulty getting out of bed due to back pain. Denies any injury. Tried Ibuprofen with no relief.

## 2024-07-17 NOTE — PROGRESS NOTES
Clare Mon is a 59 y.o. female who was seen in clinic today (7/17/2024) for an acute visit.     Assessment & Plan:   Below is the assessment and plan developed based on review of pertinent history, physical exam, labs, studies, and medications.    1. Muscle strain of right gluteal region, initial encounter  Recommend stretching - handout provided.  Ibuprofen not helpful. She can not take alleve due to allergy.   2. Primary hypertension  Assessment & Plan:  Controlled, cont amlodipine and HCTZ   3. Class 2 obesity without serious comorbidity in adult, unspecified BMI, unspecified obesity type  Assessment & Plan:  Agree with her desire to loose weight. Discussed need for diet changes to see results.    4. Hyperlipidemia, unspecified hyperlipidemia type  Assessment & Plan:  Tolerating crestor. Will hold off on fish oil. She will work on weight management which will help    She will use OTC dramamine or bonine for motion sickness on the cruise.     RTC: 3 mo CPE (10/17/24)    Subjective:   Clare was seen today for Back Pain    Back pain  - started 2 weeks ago (weekend before 4th of July)  - started out he blue, woke up Sunday AM and could not get out of bed. Pain was tightness across the low back. Pain went away after 1 week and then exact same pain came back last weekend  - now having some pain at the R buttox. Feels like tightness/cramping. Pain is 10/10 when she tries to move.   - tried ibuprofen with no relief. Also tried heat and it didn't help.    Mood is better. Figured out a sustainable situation for her living situation.    Wants to loose weight.     Going on a cruise in a couple weeks. Asks about motion sickness    Patient Active Problem List   Diagnosis    Anxiety    Hypertension    Class 2 obesity without serious comorbidity in adult, unspecified BMI, unspecified obesity type    Hyperlipidemia    Prediabetes       Prior to Admission medications    Medication Sig Start Date End Date Taking? Authorizing

## 2024-10-21 ENCOUNTER — OFFICE VISIT (OUTPATIENT)
Age: 59
End: 2024-10-21
Payer: COMMERCIAL

## 2024-10-21 VITALS
SYSTOLIC BLOOD PRESSURE: 148 MMHG | OXYGEN SATURATION: 99 % | HEIGHT: 67 IN | WEIGHT: 250.8 LBS | DIASTOLIC BLOOD PRESSURE: 97 MMHG | BODY MASS INDEX: 39.36 KG/M2 | TEMPERATURE: 97.9 F | RESPIRATION RATE: 16 BRPM | HEART RATE: 61 BPM

## 2024-10-21 DIAGNOSIS — Z12.31 BREAST CANCER SCREENING BY MAMMOGRAM: ICD-10-CM

## 2024-10-21 DIAGNOSIS — E66.812 CLASS 2 OBESITY WITHOUT SERIOUS COMORBIDITY IN ADULT, UNSPECIFIED BMI, UNSPECIFIED OBESITY TYPE: ICD-10-CM

## 2024-10-21 DIAGNOSIS — E78.5 HYPERLIPIDEMIA, UNSPECIFIED HYPERLIPIDEMIA TYPE: ICD-10-CM

## 2024-10-21 DIAGNOSIS — Z00.00 ROUTINE GENERAL MEDICAL EXAMINATION AT A HEALTH CARE FACILITY: Primary | ICD-10-CM

## 2024-10-21 DIAGNOSIS — R73.03 PREDIABETES: ICD-10-CM

## 2024-10-21 DIAGNOSIS — Z23 FLU VACCINE NEED: ICD-10-CM

## 2024-10-21 DIAGNOSIS — Z00.00 ROUTINE GENERAL MEDICAL EXAMINATION AT A HEALTH CARE FACILITY: ICD-10-CM

## 2024-10-21 DIAGNOSIS — F41.9 ANXIETY: ICD-10-CM

## 2024-10-21 DIAGNOSIS — I10 PRIMARY HYPERTENSION: ICD-10-CM

## 2024-10-21 DIAGNOSIS — Z23 NEED FOR TETANUS, DIPHTHERIA, AND ACELLULAR PERTUSSIS (TDAP) VACCINE: ICD-10-CM

## 2024-10-21 DIAGNOSIS — Z12.11 COLON CANCER SCREENING: ICD-10-CM

## 2024-10-21 PROCEDURE — 3080F DIAST BP >= 90 MM HG: CPT | Performed by: STUDENT IN AN ORGANIZED HEALTH CARE EDUCATION/TRAINING PROGRAM

## 2024-10-21 PROCEDURE — 90661 CCIIV3 VAC ABX FR 0.5 ML IM: CPT | Performed by: STUDENT IN AN ORGANIZED HEALTH CARE EDUCATION/TRAINING PROGRAM

## 2024-10-21 PROCEDURE — 90471 IMMUNIZATION ADMIN: CPT | Performed by: STUDENT IN AN ORGANIZED HEALTH CARE EDUCATION/TRAINING PROGRAM

## 2024-10-21 PROCEDURE — 3077F SYST BP >= 140 MM HG: CPT | Performed by: STUDENT IN AN ORGANIZED HEALTH CARE EDUCATION/TRAINING PROGRAM

## 2024-10-21 PROCEDURE — 90715 TDAP VACCINE 7 YRS/> IM: CPT | Performed by: STUDENT IN AN ORGANIZED HEALTH CARE EDUCATION/TRAINING PROGRAM

## 2024-10-21 PROCEDURE — 90472 IMMUNIZATION ADMIN EACH ADD: CPT | Performed by: STUDENT IN AN ORGANIZED HEALTH CARE EDUCATION/TRAINING PROGRAM

## 2024-10-21 PROCEDURE — 99396 PREV VISIT EST AGE 40-64: CPT | Performed by: STUDENT IN AN ORGANIZED HEALTH CARE EDUCATION/TRAINING PROGRAM

## 2024-10-21 SDOH — ECONOMIC STABILITY: INCOME INSECURITY: HOW HARD IS IT FOR YOU TO PAY FOR THE VERY BASICS LIKE FOOD, HOUSING, MEDICAL CARE, AND HEATING?: NOT HARD AT ALL

## 2024-10-21 SDOH — ECONOMIC STABILITY: FOOD INSECURITY: WITHIN THE PAST 12 MONTHS, THE FOOD YOU BOUGHT JUST DIDN'T LAST AND YOU DIDN'T HAVE MONEY TO GET MORE.: NEVER TRUE

## 2024-10-21 SDOH — ECONOMIC STABILITY: FOOD INSECURITY: WITHIN THE PAST 12 MONTHS, YOU WORRIED THAT YOUR FOOD WOULD RUN OUT BEFORE YOU GOT MONEY TO BUY MORE.: NEVER TRUE

## 2024-10-21 ASSESSMENT — PATIENT HEALTH QUESTIONNAIRE - PHQ9: DEPRESSION UNABLE TO ASSESS: PT REFUSES

## 2024-10-21 NOTE — ASSESSMENT & PLAN NOTE
Cont crestor. Will update late to assess control. TG also elevated - we discussed this is diet driven

## 2024-10-21 NOTE — PROGRESS NOTES
Clare Mon is a 59 y.o. year old female who presents today (10/21/24) for annual physical exam.    Assessment & Plan:   1. Routine general medical examination at a health care facility  Reviewed diet and exercise habits - see below. Updated health maintenance, see below. Check screening labs.   -     CBC; Future  -     Comprehensive Metabolic Panel; Future  -     Lipid Panel; Future  2. Primary hypertension  Assessment & Plan:  In office BP high but she tells me it is controlled at home. Cont amlodipine, HCTZ   3. Anxiety  Assessment & Plan:  Controlled, cont lexapro, PRN propranolol. We talked about setting boundaries with her family as they tend to make comments that upset her (ex comments about appearance). She did file bankrupty which has alleviated some of her stress  4. Hyperlipidemia, unspecified hyperlipidemia type  Assessment & Plan:  Cont crestor. Will update late to assess control. TG also elevated - we discussed this is diet driven   5. Class 2 obesity without serious comorbidity in adult, unspecified BMI, unspecified obesity type  Assessment & Plan:  We discussed some diet changes to loose weight, encouraged her to participate in regular physical activity   6. Prediabetes  Assessment & Plan:  We discussed some diet changes to cut out added sugars and processed carbs   Orders:  -     Hemoglobin A1C; Future  7. Breast cancer screening by mammogram  -     RENNY DIGITAL SCREEN W OR WO CAD BILATERAL; Future  8. Colon cancer screening  -     AFL - Torin Hinson MD, Gastroenterology, [place] ([address])  9. Need for tetanus, diphtheria, and acellular pertussis (Tdap) vaccine  -     Tdap, BOOSTRIX, (age 10 yrs+), IM  10. Flu vaccine need  -     Influenza, FLUCELVAX Trivalent, (age 6 mo+) IM, Preservative Free, 0.5mL    Health Maintenance   Flu vaccine: will get today   COVID vaccine: declines  Tetanus vaccine: 11/2014 - will get today    Shingles vaccine: UTD  Pneumonia vaccine:   Cervical cancer screening:

## 2024-10-21 NOTE — ASSESSMENT & PLAN NOTE
Controlled, cont lexapro, PRN propranolol. We talked about setting boundaries with her family as they tend to make comments that upset her (ex comments about appearance). She did file bankrupty which has alleviated some of her stress

## 2024-10-21 NOTE — ASSESSMENT & PLAN NOTE
We discussed some diet changes to loose weight, encouraged her to participate in regular physical activity

## 2024-10-22 LAB
ALBUMIN SERPL-MCNC: 4.1 G/DL (ref 3.5–5)
ALBUMIN/GLOB SERPL: 1.2 (ref 1.1–2.2)
ALP SERPL-CCNC: 94 U/L (ref 45–117)
ALT SERPL-CCNC: 24 U/L (ref 12–78)
ANION GAP SERPL CALC-SCNC: 4 MMOL/L (ref 2–12)
AST SERPL-CCNC: 25 U/L (ref 15–37)
BILIRUB SERPL-MCNC: 1.2 MG/DL (ref 0.2–1)
BUN SERPL-MCNC: 13 MG/DL (ref 6–20)
BUN/CREAT SERPL: 17 (ref 12–20)
CALCIUM SERPL-MCNC: 9.4 MG/DL (ref 8.5–10.1)
CHLORIDE SERPL-SCNC: 106 MMOL/L (ref 97–108)
CHOLEST SERPL-MCNC: 131 MG/DL
CO2 SERPL-SCNC: 29 MMOL/L (ref 21–32)
CREAT SERPL-MCNC: 0.77 MG/DL (ref 0.55–1.02)
ERYTHROCYTE [DISTWIDTH] IN BLOOD BY AUTOMATED COUNT: 13.2 % (ref 11.5–14.5)
EST. AVERAGE GLUCOSE BLD GHB EST-MCNC: 128 MG/DL
GLOBULIN SER CALC-MCNC: 3.3 G/DL (ref 2–4)
GLUCOSE SERPL-MCNC: 127 MG/DL (ref 65–100)
HBA1C MFR BLD: 6.1 % (ref 4–5.6)
HCT VFR BLD AUTO: 41.5 % (ref 35–47)
HDLC SERPL-MCNC: 37 MG/DL
HDLC SERPL: 3.5 (ref 0–5)
HGB BLD-MCNC: 14 G/DL (ref 11.5–16)
LDLC SERPL CALC-MCNC: 36.4 MG/DL (ref 0–100)
MCH RBC QN AUTO: 30.4 PG (ref 26–34)
MCHC RBC AUTO-ENTMCNC: 33.7 G/DL (ref 30–36.5)
MCV RBC AUTO: 90 FL (ref 80–99)
NRBC # BLD: 0 K/UL (ref 0–0.01)
NRBC BLD-RTO: 0 PER 100 WBC
PLATELET # BLD AUTO: 180 K/UL (ref 150–400)
PMV BLD AUTO: 10.6 FL (ref 8.9–12.9)
POTASSIUM SERPL-SCNC: 3.6 MMOL/L (ref 3.5–5.1)
PROT SERPL-MCNC: 7.4 G/DL (ref 6.4–8.2)
RBC # BLD AUTO: 4.61 M/UL (ref 3.8–5.2)
SODIUM SERPL-SCNC: 139 MMOL/L (ref 136–145)
TRIGL SERPL-MCNC: 288 MG/DL
VLDLC SERPL CALC-MCNC: 57.6 MG/DL
WBC # BLD AUTO: 4.8 K/UL (ref 3.6–11)

## 2024-10-23 NOTE — RESULT ENCOUNTER NOTE
Will send [facility]  - Pre-DM  - LDL at goal, TG elevated - discussed diet during the visit   - mild bilirubin elevation noted - no further workup indicated at present  - normal renal function  - normal CBC

## 2024-12-19 RX ORDER — ESCITALOPRAM OXALATE 20 MG/1
20 TABLET ORAL NIGHTLY
Qty: 90 TABLET | Refills: 3 | Status: SHIPPED | OUTPATIENT
Start: 2024-12-19

## 2024-12-19 RX ORDER — HYDROCHLOROTHIAZIDE 25 MG/1
25 TABLET ORAL DAILY
Qty: 90 TABLET | Refills: 3 | Status: SHIPPED | OUTPATIENT
Start: 2024-12-19

## 2024-12-19 NOTE — TELEPHONE ENCOUNTER
Pt last seen on 10/21/24. Due to return in 6 months.    Has appt on 4/21/25.    Both Rx last filled on 6/28/24 #90/0RF.    Rx sent to pharmacy as #90/3RF and verified by Verbal Order Read Back with provider.

## 2025-02-14 RX ORDER — AMLODIPINE BESYLATE 5 MG/1
5 TABLET ORAL DAILY
Qty: 90 TABLET | Refills: 2 | Status: SHIPPED | OUTPATIENT
Start: 2025-02-14

## 2025-02-14 NOTE — TELEPHONE ENCOUNTER
Pt last seen on 10/21/2024. Due to return in 6 months.    Has appt on 4/21/2025.    Rx last filled on 2/19/24 #90/2RF.    Rx sent to pharmacy as previously filled and verified by Verbal Order Read Back with provider.

## 2025-02-14 NOTE — TELEPHONE ENCOUNTER
Medication Refill Request    Clare Mon is requesting a refill of the following medication(s):     Rosuvastatin    Please send refill to:     [id number] - [place] - [id number] - [place] [phone] - F [phone] [address] [place] [address]  Phone: [phone] Fax: [phone]

## 2025-02-19 ENCOUNTER — TELEPHONE (OUTPATIENT)
Age: 60
End: 2025-02-19

## 2025-02-19 RX ORDER — ROSUVASTATIN CALCIUM 5 MG/1
5 TABLET, COATED ORAL DAILY
Qty: 90 TABLET | Refills: 2 | Status: SHIPPED | OUTPATIENT
Start: 2025-02-19

## 2025-02-19 NOTE — TELEPHONE ENCOUNTER
Pt last seen on 10/21/2024. Due to return in 6 months.    Has appt on 4/21/2025.    Rx last filled on 5/23/24 #90/1RF.    Rx sent to pharmacy as #90/2RF and verified by Verbal Order Read Back with provider.

## 2025-02-19 NOTE — TELEPHONE ENCOUNTER
Medication Refill Request    Clare Mon is requesting a refill of the following medication(s):     Rosuvastatin    Please send refill to:     [id number] - [place] - [address] - [place] [phone] - F [phone] [address] [place] [address]  Phone: [phone] Fax: [phone]

## 2025-04-19 SDOH — ECONOMIC STABILITY: FOOD INSECURITY: WITHIN THE PAST 12 MONTHS, THE FOOD YOU BOUGHT JUST DIDN'T LAST AND YOU DIDN'T HAVE MONEY TO GET MORE.: NEVER TRUE

## 2025-04-19 SDOH — ECONOMIC STABILITY: FOOD INSECURITY: WITHIN THE PAST 12 MONTHS, YOU WORRIED THAT YOUR FOOD WOULD RUN OUT BEFORE YOU GOT MONEY TO BUY MORE.: NEVER TRUE

## 2025-04-19 SDOH — ECONOMIC STABILITY: TRANSPORTATION INSECURITY
IN THE PAST 12 MONTHS, HAS LACK OF TRANSPORTATION KEPT YOU FROM MEETINGS, WORK, OR FROM GETTING THINGS NEEDED FOR DAILY LIVING?: NO

## 2025-04-19 SDOH — ECONOMIC STABILITY: INCOME INSECURITY: IN THE LAST 12 MONTHS, WAS THERE A TIME WHEN YOU WERE NOT ABLE TO PAY THE MORTGAGE OR RENT ON TIME?: NO

## 2025-04-19 SDOH — ECONOMIC STABILITY: TRANSPORTATION INSECURITY
IN THE PAST 12 MONTHS, HAS THE LACK OF TRANSPORTATION KEPT YOU FROM MEDICAL APPOINTMENTS OR FROM GETTING MEDICATIONS?: NO

## 2025-04-21 ENCOUNTER — OFFICE VISIT (OUTPATIENT)
Age: 60
End: 2025-04-21
Payer: COMMERCIAL

## 2025-04-21 VITALS
HEART RATE: 66 BPM | OXYGEN SATURATION: 97 % | RESPIRATION RATE: 18 BRPM | SYSTOLIC BLOOD PRESSURE: 132 MMHG | BODY MASS INDEX: 38.67 KG/M2 | HEIGHT: 67 IN | WEIGHT: 246.4 LBS | TEMPERATURE: 97.4 F | DIASTOLIC BLOOD PRESSURE: 86 MMHG

## 2025-04-21 DIAGNOSIS — I10 PRIMARY HYPERTENSION: Primary | ICD-10-CM

## 2025-04-21 DIAGNOSIS — R73.03 PREDIABETES: ICD-10-CM

## 2025-04-21 DIAGNOSIS — E78.5 HYPERLIPIDEMIA, UNSPECIFIED HYPERLIPIDEMIA TYPE: ICD-10-CM

## 2025-04-21 DIAGNOSIS — F41.9 ANXIETY: ICD-10-CM

## 2025-04-21 DIAGNOSIS — K91.89 POSTCHOLECYSTECTOMY DIARRHEA: ICD-10-CM

## 2025-04-21 DIAGNOSIS — E66.812 CLASS 2 OBESITY WITHOUT SERIOUS COMORBIDITY IN ADULT, UNSPECIFIED BMI, UNSPECIFIED OBESITY TYPE: ICD-10-CM

## 2025-04-21 DIAGNOSIS — R17 ELEVATED BILIRUBIN: ICD-10-CM

## 2025-04-21 DIAGNOSIS — R19.7 POSTCHOLECYSTECTOMY DIARRHEA: ICD-10-CM

## 2025-04-21 LAB
ALBUMIN SERPL-MCNC: 4 G/DL (ref 3.5–5)
ALBUMIN/GLOB SERPL: 1.3 (ref 1.1–2.2)
ALP SERPL-CCNC: 91 U/L (ref 45–117)
ALT SERPL-CCNC: 28 U/L (ref 12–78)
AST SERPL-CCNC: 22 U/L (ref 15–37)
BILIRUB DIRECT SERPL-MCNC: 0.2 MG/DL (ref 0–0.2)
BILIRUB SERPL-MCNC: 0.7 MG/DL (ref 0.2–1)
CHOLEST SERPL-MCNC: 119 MG/DL
EST. AVERAGE GLUCOSE BLD GHB EST-MCNC: 137 MG/DL
GLOBULIN SER CALC-MCNC: 3.1 G/DL (ref 2–4)
HBA1C MFR BLD: 6.4 % (ref 4–5.6)
HDLC SERPL-MCNC: 35 MG/DL
HDLC SERPL: 3.4 (ref 0–5)
LDLC SERPL CALC-MCNC: 39.8 MG/DL (ref 0–100)
PROT SERPL-MCNC: 7.1 G/DL (ref 6.4–8.2)
TRIGL SERPL-MCNC: 221 MG/DL
VLDLC SERPL CALC-MCNC: 44.2 MG/DL

## 2025-04-21 PROCEDURE — 3075F SYST BP GE 130 - 139MM HG: CPT | Performed by: STUDENT IN AN ORGANIZED HEALTH CARE EDUCATION/TRAINING PROGRAM

## 2025-04-21 PROCEDURE — 3079F DIAST BP 80-89 MM HG: CPT | Performed by: STUDENT IN AN ORGANIZED HEALTH CARE EDUCATION/TRAINING PROGRAM

## 2025-04-21 PROCEDURE — 99214 OFFICE O/P EST MOD 30 MIN: CPT | Performed by: STUDENT IN AN ORGANIZED HEALTH CARE EDUCATION/TRAINING PROGRAM

## 2025-04-21 SDOH — ECONOMIC STABILITY: FOOD INSECURITY: WITHIN THE PAST 12 MONTHS, YOU WORRIED THAT YOUR FOOD WOULD RUN OUT BEFORE YOU GOT MONEY TO BUY MORE.: NEVER TRUE

## 2025-04-21 SDOH — ECONOMIC STABILITY: FOOD INSECURITY: WITHIN THE PAST 12 MONTHS, THE FOOD YOU BOUGHT JUST DIDN'T LAST AND YOU DIDN'T HAVE MONEY TO GET MORE.: NEVER TRUE

## 2025-04-21 ASSESSMENT — ANXIETY QUESTIONNAIRES
5. BEING SO RESTLESS THAT IT IS HARD TO SIT STILL: NOT AT ALL
2. NOT BEING ABLE TO STOP OR CONTROL WORRYING: SEVERAL DAYS
GAD7 TOTAL SCORE: 9
4. TROUBLE RELAXING: MORE THAN HALF THE DAYS
3. WORRYING TOO MUCH ABOUT DIFFERENT THINGS: SEVERAL DAYS
1. FEELING NERVOUS, ANXIOUS, OR ON EDGE: SEVERAL DAYS
6. BECOMING EASILY ANNOYED OR IRRITABLE: NEARLY EVERY DAY
7. FEELING AFRAID AS IF SOMETHING AWFUL MIGHT HAPPEN: SEVERAL DAYS

## 2025-04-21 ASSESSMENT — PATIENT HEALTH QUESTIONNAIRE - PHQ9
SUM OF ALL RESPONSES TO PHQ QUESTIONS 1-9: 8
6. FEELING BAD ABOUT YOURSELF - OR THAT YOU ARE A FAILURE OR HAVE LET YOURSELF OR YOUR FAMILY DOWN: NOT AT ALL
10. IF YOU CHECKED OFF ANY PROBLEMS, HOW DIFFICULT HAVE THESE PROBLEMS MADE IT FOR YOU TO DO YOUR WORK, TAKE CARE OF THINGS AT HOME, OR GET ALONG WITH OTHER PEOPLE: NOT DIFFICULT AT ALL
SUM OF ALL RESPONSES TO PHQ QUESTIONS 1-9: 2
4. FEELING TIRED OR HAVING LITTLE ENERGY: NOT AT ALL
SUM OF ALL RESPONSES TO PHQ QUESTIONS 1-9: 2
2. FEELING DOWN, DEPRESSED OR HOPELESS: NOT AT ALL
SUM OF ALL RESPONSES TO PHQ QUESTIONS 1-9: 8
7. TROUBLE CONCENTRATING ON THINGS, SUCH AS READING THE NEWSPAPER OR WATCHING TELEVISION: MORE THAN HALF THE DAYS
8. MOVING OR SPEAKING SO SLOWLY THAT OTHER PEOPLE COULD HAVE NOTICED. OR THE OPPOSITE, BEING SO FIGETY OR RESTLESS THAT YOU HAVE BEEN MOVING AROUND A LOT MORE THAN USUAL: NOT AT ALL
9. THOUGHTS THAT YOU WOULD BE BETTER OFF DEAD, OR OF HURTING YOURSELF: NOT AT ALL
SUM OF ALL RESPONSES TO PHQ QUESTIONS 1-9: 2
SUM OF ALL RESPONSES TO PHQ QUESTIONS 1-9: 8
2. FEELING DOWN, DEPRESSED OR HOPELESS: NOT AT ALL
SUM OF ALL RESPONSES TO PHQ QUESTIONS 1-9: 8
SUM OF ALL RESPONSES TO PHQ QUESTIONS 1-9: 2
1. LITTLE INTEREST OR PLEASURE IN DOING THINGS: MORE THAN HALF THE DAYS
3. TROUBLE FALLING OR STAYING ASLEEP: MORE THAN HALF THE DAYS
5. POOR APPETITE OR OVEREATING: MORE THAN HALF THE DAYS
1. LITTLE INTEREST OR PLEASURE IN DOING THINGS: MORE THAN HALF THE DAYS

## 2025-04-21 NOTE — PROGRESS NOTES
Clare Mon is a 60 y.o. year old female who presents today (25) for follow-up.     Assessment & Plan:   1. Primary hypertension  Assessment & Plan:  Home BP running 140/90s, which is a little above goal. It sounds like HCTZ may be out of her system by the time she checks BP.  She will move HCTZ to AM and continue amlodipine. Check BP after work. She will send me some readings before she refills HCTZ and if running above goal will switch to Chlorthalidone 25mg.     2. Prediabetes  Assessment & Plan:  She plans to restart [employer]. Will update A1c to assess control   Orders:  -     Hemoglobin A1C; Future  3. Class 2 obesity without serious comorbidity in adult, unspecified BMI, unspecified obesity type  Assessment & Plan:  She plans to start walking and rejoin the gym, restart [employer].   4. Hyperlipidemia, unspecified hyperlipidemia type  Assessment & Plan:  On crestor. LDL is at goal. TG elevated. Will update lab today   Orders:  -     Lipid Panel; Future  5. Anxiety  Assessment & Plan:  Having some mild anxiety and depression. I think she would benefit from talking with a therapist. We will cont lexapro, PRN propranolol.    6. Elevated bilirubin  Will trend. Elevated for the first time on labs 6 mo ago. ?metabolic assoc fatty liver  -     Hepatic Function Panel; Future       Health Maintenance   Flu vaccine: 10/2024  COVID vaccine: declines  Tetanus vaccine: 10/2024   Shingles vaccine: UTD  Pneumonia vaccine:   Cervical cancer screenin2022 through [facility]  Breast cancer screenin2023 - tells me rpt done at [facility]  Colon cancer screening: Colon 24 Dr Hinson Q5yr  DEXA:   Hep C: 10/2023  HIV: 10/2023  Lipid: 10/2024 - check today  DM: 10/2024 - check today  Healthcare decision maker: sister  ACP: on file    RTC: 6 mo CPE    Subjective:   Clare was seen today for 6 Month Follow-Up (Onset January: Patient states from time to time experiences pain in right shoulder)    History of Present

## 2025-04-21 NOTE — ASSESSMENT & PLAN NOTE
Having some mild anxiety and depression. I think she would benefit from talking with a therapist. We will cont lexapro, PRN propranolol.

## 2025-04-21 NOTE — ASSESSMENT & PLAN NOTE
Home BP running 140/90s, which is a little above goal. It sounds like HCTZ may be out of her system by the time she checks BP.  She will move HCTZ to AM and continue amlodipine. Check BP after work. She will send me some readings before she refills HCTZ and if running above goal will switch to Chlorthalidone 25mg.

## 2025-04-21 NOTE — PATIENT INSTRUCTIONS
Please work with a therapist to work on stress and anxiety management     Take HCTZ in the AM, not at night because it makes you urinate  Send me some blood pressure readings a couple of days before your next refill     If Benefiber doesn't work to solidify your stools send me a message to try colestipol

## 2025-04-24 ENCOUNTER — RESULTS FOLLOW-UP (OUTPATIENT)
Age: 60
End: 2025-04-24

## 2025-04-24 NOTE — RESULT ENCOUNTER NOTE
Will send [facility]  - cholesterol well controlled, cont crestor  - A1c at the top of the prediabetes range  - bili normalized, LFTs normal

## (undated) DEVICE — GOWN,SIRUS,NONRNF,SETINSLV,XL,20/CS: Brand: MEDLINE

## (undated) DEVICE — SET SEALS HYSTEROSCOPE DISP -- MYOSURE  EA=10

## (undated) DEVICE — COVER,TABLE,60X90,STERILE: Brand: MEDLINE

## (undated) DEVICE — INSULATED BLADE ELECTRODE: Brand: EDGE

## (undated) DEVICE — SUT SLK 2-0SH 30IN BLK --

## (undated) DEVICE — GLOVE ORANGE PI 7 1/2   MSG9075

## (undated) DEVICE — TOWEL,OR,DSP,ST,BLUE,STD,4/PK,20PK/CS: Brand: MEDLINE

## (undated) DEVICE — GARMENT,MEDLINE,DVT,INT,CALF,MED, GEN2: Brand: MEDLINE

## (undated) DEVICE — BASIC SINGLE BASIN BTC-LF: Brand: MEDLINE INDUSTRIES, INC.

## (undated) DEVICE — DRAPE,LAPAROTOMY,T,PEDI,STERILE: Brand: MEDLINE

## (undated) DEVICE — SOLUTION IRRIG 1000ML 0.9% SOD CHL USP POUR PLAS BTL

## (undated) DEVICE — SOLUTION IRRIG 3000ML 0.9% SOD CHL USP UROMATIC PLAS CONT

## (undated) DEVICE — TOWEL SURG W17XL27IN STD BLU COT NONFENESTRATED PREWASHED

## (undated) DEVICE — COVER US PRB W5XL96IN LTX W/ GEL

## (undated) DEVICE — PREMIUM WET SKIN PREP TRAY: Brand: MEDLINE INDUSTRIES, INC.

## (undated) DEVICE — SUTURE MCRYL SZ 4-0 L27IN ABSRB UD L19MM PS-2 1/2 CIR PRIM Y426H

## (undated) DEVICE — SMOKE EVACUATION PENCIL: Brand: VALLEYLAB

## (undated) DEVICE — DRAPE,LITHOTOMY,STERILE: Brand: MEDLINE

## (undated) DEVICE — GLOVE SURG SZ 65 L12IN FNGR THK94MIL STD WHT LTX FREE

## (undated) DEVICE — HANDLE LT SNAP ON ULT DURABLE LENS FOR TRUMPF ALC DISPOSABLE

## (undated) DEVICE — DEVICE TISS REM IU CANSTR VAC TB FT PEDAL DISPOSABLE MYOSURE

## (undated) DEVICE — FLUID MGMT SYS FLUENT KIT 6/PK

## (undated) DEVICE — SPONGE GZ W4XL4IN COT RADPQ HIGHLY ABSRB

## (undated) DEVICE — MARKER,SKIN,WI/RULER AND LABELS: Brand: MEDLINE

## (undated) DEVICE — DERMABOND SKIN ADH 0.7ML -- DERMABOND ADVANCED 12/BX

## (undated) DEVICE — BASIN ST MAJOR-NO CAUTERY: Brand: MEDLINE INDUSTRIES, INC.

## (undated) DEVICE — SYR 10ML LUER LOK 1/5ML GRAD --

## (undated) DEVICE — DRAPE,REIN 53X77,STERILE: Brand: MEDLINE

## (undated) DEVICE — SHEET,DRAPE,UNDERBUTTOCK,GRAD POUCH,PORT: Brand: MEDLINE

## (undated) DEVICE — PAD,SANITARY,11 IN,MAXI,N-STRL,IND WRAP: Brand: MEDLINE

## (undated) DEVICE — SUTURE VCRL SZ 3-0 L27IN ABSRB UD L26MM SH 1/2 CIR J416H

## (undated) DEVICE — REM POLYHESIVE ADULT PATIENT RETURN ELECTRODE: Brand: VALLEYLAB

## (undated) DEVICE — HYPODERMIC SAFETY NEEDLE: Brand: MONOJECT

## (undated) DEVICE — PREP SKN CHLRAPRP APL 26ML STR --